# Patient Record
Sex: MALE | Race: BLACK OR AFRICAN AMERICAN | Employment: FULL TIME | URBAN - METROPOLITAN AREA
[De-identification: names, ages, dates, MRNs, and addresses within clinical notes are randomized per-mention and may not be internally consistent; named-entity substitution may affect disease eponyms.]

---

## 2024-04-12 ENCOUNTER — APPOINTMENT (OUTPATIENT)
Dept: VASCULAR SURGERY | Facility: HOSPITAL | Age: 59
End: 2024-04-12

## 2024-04-12 ENCOUNTER — HOSPITAL ENCOUNTER (EMERGENCY)
Facility: HOSPITAL | Age: 59
Discharge: HOME | End: 2024-04-12
Attending: EMERGENCY MEDICINE

## 2024-04-12 VITALS
WEIGHT: 180.34 LBS | OXYGEN SATURATION: 99 % | TEMPERATURE: 98.4 F | SYSTOLIC BLOOD PRESSURE: 140 MMHG | DIASTOLIC BLOOD PRESSURE: 92 MMHG | BODY MASS INDEX: 25.25 KG/M2 | HEIGHT: 71 IN | HEART RATE: 68 BPM | RESPIRATION RATE: 16 BRPM

## 2024-04-12 DIAGNOSIS — I82.432 ACUTE DEEP VEIN THROMBOSIS (DVT) OF POPLITEAL VEIN OF LEFT LOWER EXTREMITY (HCC): Primary | ICD-10-CM

## 2024-04-12 LAB — ECHO BSA: 2.02 M2

## 2024-04-12 PROCEDURE — 93971 EXTREMITY STUDY: CPT

## 2024-04-12 PROCEDURE — 99284 EMERGENCY DEPT VISIT MOD MDM: CPT

## 2024-04-12 PROCEDURE — 6370000000 HC RX 637 (ALT 250 FOR IP)

## 2024-04-12 RX ORDER — ACETAMINOPHEN 500 MG
1000 TABLET ORAL
Status: COMPLETED | OUTPATIENT
Start: 2024-04-12 | End: 2024-04-12

## 2024-04-12 RX ADMIN — ACETAMINOPHEN 1000 MG: 500 TABLET ORAL at 11:19

## 2024-04-12 ASSESSMENT — PAIN DESCRIPTION - DESCRIPTORS: DESCRIPTORS: SHARP;STABBING

## 2024-04-12 ASSESSMENT — PAIN SCALES - GENERAL: PAINLEVEL_OUTOF10: 9

## 2024-04-12 ASSESSMENT — PAIN DESCRIPTION - LOCATION: LOCATION: LEG

## 2024-04-12 ASSESSMENT — ENCOUNTER SYMPTOMS: ABDOMINAL PAIN: 0

## 2024-04-12 ASSESSMENT — PAIN DESCRIPTION - ORIENTATION: ORIENTATION: POSTERIOR;UPPER

## 2024-04-12 ASSESSMENT — PAIN - FUNCTIONAL ASSESSMENT: PAIN_FUNCTIONAL_ASSESSMENT: 0-10

## 2024-04-12 NOTE — ED TRIAGE NOTES
Pt given 1unit of RBC, 2g IV mg this shift. No c/o pain. Instances of feeling SOB, but O2 sats greater than 93%. No c/o pain.    Pt is having Left posterior pain in the upper thigh that's been going on for 3 weeks  Pt was not doing anything to cause this pain but does sts he had a DVT in his left leg  Pt is currently on medication for the clot  Pt sts he has been straining to urinate and has pain when he urinates

## 2024-04-12 NOTE — DISCHARGE INSTRUCTIONS
You have an age-indeterminate blood clot behind your left knee.  We are sending a prescription for Eliquis, a blood thinner, to your pharmacy.  Please take as prescribed.  It is very important that you follow-up with your primary care given your new medication and likely need for a new prescription after this 1 month supply is finished. [facility], is given here as needed.  If symptoms worsen or new symptoms arise, such as chest pain or shortness of breath, please report to the nearest emergency department.  As discussed, the blood thinner will make you more prone to bleeding, so if bleeding concerns arise, please report to the nearest emergency department.    Thank you for allowing us to provide you with medical care today.  We realize that you have many choices for your emergency care needs.  We thank you for choosing [facility].  Please choose us in the future for any continued health care needs.     The exam and treatment you received in the Emergency Department were for an emergent problem and are not intended as complete care. It is important that you follow up with a doctor, nurse practitioner, or physician's assistant for ongoing care. If your symptoms worsen or you do not improve as expected and you are unable to reach your usual health care provider, you should return to the Emergency Department.  We are available 24 hours a day.     Please make an appointment with your health care provider(s) for follow up of your Emergency Department visit.  Take this sheet with you when you go to your follow-up visit.

## 2024-04-12 NOTE — ED PROVIDER NOTES
[facility] EMERGENCY DEPT  EMERGENCY DEPARTMENT ENCOUNTER      Pt Name: Anthony Santos III  MRN: [id number]  Birthdate 1965  Date of evaluation: 4/12/2024  Provider: Erich Arango PA-C    CHIEF COMPLAINT       Chief Complaint   Patient presents with    Leg Pain         HISTORY OF PRESENT ILLNESS   (Location/Symptom, Timing/Onset, Context/Setting, Quality, Duration, Modifying Factors, Severity)  Note limiting factors.   59 year old male with history of blood clot last year in left leg reports to ED with left posterior thigh pain over past 3 weeks.  Endorses improvement of pain when ambulating/playing pickle ball.  Was on Eliquis from his primary care in [place] from May 2023 to August 2023 when he was taken off of it due to the provoked nature of the DVT from a long car ride.  Denies chest pain, shortness of breath, fever, chills, or leg swelling.  Patient recently returned from addiction treatment in [place] and is in the process of establishing primary care here.            Review of External Medical Records:     Nursing Notes were reviewed.    REVIEW OF SYSTEMS    (2-9 systems for level 4, 10 or more for level 5)     Review of Systems   Gastrointestinal:  Negative for abdominal pain.   Genitourinary: Negative.    Neurological:  Negative for dizziness and weakness.       Except as noted above the remainder of the review of systems was reviewed and negative.       PAST MEDICAL HISTORY   No past medical history on file.      SURGICAL HISTORY     No past surgical history on file.      CURRENT MEDICATIONS       Discharge Medication List as of 4/12/2024 12:52 PM          ALLERGIES     Patient has no known allergies.    FAMILY HISTORY     No family history on file.       SOCIAL HISTORY       Social History     Socioeconomic History    Marital status: Single           PHYSICAL EXAM    (up to 7 for level 4, 8 or more for level 5)     ED Triage Vitals   BP Temp Temp src Pulse Resp SpO2 Height Weight   --

## 2024-04-20 ENCOUNTER — HOSPITAL ENCOUNTER (EMERGENCY)
Facility: HOSPITAL | Age: 59
Discharge: HOME | End: 2024-04-20
Attending: EMERGENCY MEDICINE
Payer: MEDICAID

## 2024-04-20 VITALS
BODY MASS INDEX: 24.64 KG/M2 | OXYGEN SATURATION: 100 % | DIASTOLIC BLOOD PRESSURE: 112 MMHG | HEART RATE: 69 BPM | RESPIRATION RATE: 20 BRPM | HEIGHT: 71 IN | WEIGHT: 176 LBS | SYSTOLIC BLOOD PRESSURE: 166 MMHG | TEMPERATURE: 98.5 F

## 2024-04-20 DIAGNOSIS — M54.32 SCIATICA OF LEFT SIDE: Primary | ICD-10-CM

## 2024-04-20 PROCEDURE — 6370000000 HC RX 637 (ALT 250 FOR IP): Performed by: EMERGENCY MEDICINE

## 2024-04-20 PROCEDURE — 99283 EMERGENCY DEPT VISIT LOW MDM: CPT

## 2024-04-20 RX ORDER — HYDROCODONE BITARTRATE AND ACETAMINOPHEN 5; 325 MG/1; MG/1
1 TABLET ORAL
Status: COMPLETED | OUTPATIENT
Start: 2024-04-20 | End: 2024-04-20

## 2024-04-20 RX ORDER — HYDROCODONE BITARTRATE AND ACETAMINOPHEN 5; 325 MG/1; MG/1
1 TABLET ORAL EVERY 6 HOURS PRN
Qty: 11 TABLET | Refills: 0 | Status: SHIPPED | OUTPATIENT
Start: 2024-04-20 | End: 2024-04-23

## 2024-04-20 RX ADMIN — HYDROCODONE BITARTRATE AND ACETAMINOPHEN 1 TABLET: 5; 325 TABLET ORAL at 22:06

## 2024-04-20 ASSESSMENT — PAIN DESCRIPTION - DESCRIPTORS
DESCRIPTORS: THROBBING;SHARP
DESCRIPTORS: SHARP;THROBBING

## 2024-04-20 ASSESSMENT — PAIN - FUNCTIONAL ASSESSMENT: PAIN_FUNCTIONAL_ASSESSMENT: 0-10

## 2024-04-20 ASSESSMENT — PAIN DESCRIPTION - ORIENTATION
ORIENTATION: LEFT
ORIENTATION: POSTERIOR;LEFT

## 2024-04-20 ASSESSMENT — PAIN SCALES - GENERAL
PAINLEVEL_OUTOF10: 10
PAINLEVEL_OUTOF10: 10

## 2024-04-20 ASSESSMENT — PAIN DESCRIPTION - LOCATION
LOCATION: LEG
LOCATION: LEG

## 2024-04-21 NOTE — ED PROVIDER NOTES
[facility] EMERGENCY DEPT  EMERGENCY DEPARTMENT ENCOUNTER      Pt Name: Anthony Santos III  MRN: [id number]  Birthdate 1965  Date of evaluation: 4/20/2024  Provider: Norris Stark MD      HISTORY OF PRESENT ILLNESS      59-year-old male with recent diagnosis of left DVT, started on Eliquis about a week ago presents to the emergency department with his spouse with concern for ongoing potentially worsening left leg pain.  No fevers, no chest pain, no shortness of breath.  He reports compliance with the Eliquis    The history is provided by the patient and medical records.           Nursing Notes were reviewed.    REVIEW OF SYSTEMS         Review of Systems        PAST MEDICAL HISTORY   No past medical history on file.      SURGICAL HISTORY     No past surgical history on file.      CURRENT MEDICATIONS       Previous Medications    APIXABAN (ELIQUIS) 5 MG TABS TABLET    Take 2 tablets by mouth 2 times daily for 7 days, THEN 1 tablet 2 times daily for 21 days.       ALLERGIES     Patient has no known allergies.    FAMILY HISTORY     No family history on file.       SOCIAL HISTORY       Social History     Socioeconomic History    Marital status: Single         PHYSICAL EXAM       ED Triage Vitals [04/20/24 2040]   BP Temp Temp Source Pulse Resp SpO2 Height Weight - Scale   (!) 140/102 98.5 °F (36.9 °C) Oral 69 -- 100 % 1.803 m (5' 11\") 79.8 kg (176 lb)       Body mass index is 24.55 kg/m².    Physical Exam  Vitals and nursing note reviewed.   Constitutional:       Appearance: Normal appearance.   Musculoskeletal:      Right lower leg: No edema.      Left lower leg: Edema (Mild) present.      Comments: Positive straight leg raise on the left   Neurological:      Mental Status: He is alert.             EMERGENCY DEPARTMENT COURSE and DIFFERENTIAL DIAGNOSIS/MDM:   Vitals:    Vitals:    04/20/24 2040   BP: (!) 140/102   Pulse: 69   Temp: 98.5 °F (36.9 °C)   TempSrc: Oral   SpO2: 100%   Weight: 79.8 kg (176 lb)

## 2024-04-21 NOTE — DISCHARGE INSTRUCTIONS
Generally anti-inflammatory medicine such as Motrin, Aleve are used for sciatica type pain.  You are unable to take these due to your blood clot and treatment with a blood thinner.  We are providing short course of pain medicine.  We recommend that you follow-up with a orthopedic or back specialist for further management.

## 2024-04-21 NOTE — ED TRIAGE NOTES
Pt ambulatory to triage w/ c/o left leg pain. Pt was diagnosed last week with a blood clot in that leg. Pt states he has been taking his medication. States pain never went away.    Denies SOB/CP.

## 2024-04-24 ENCOUNTER — OFFICE VISIT (OUTPATIENT)
Age: 59
End: 2024-04-24
Payer: MEDICAID

## 2024-04-24 VITALS
RESPIRATION RATE: 16 BRPM | SYSTOLIC BLOOD PRESSURE: 154 MMHG | TEMPERATURE: 97 F | HEART RATE: 78 BPM | HEIGHT: 71 IN | BODY MASS INDEX: 25.76 KG/M2 | DIASTOLIC BLOOD PRESSURE: 92 MMHG | WEIGHT: 184 LBS | OXYGEN SATURATION: 98 %

## 2024-04-24 DIAGNOSIS — I82.402 RECURRENT ACUTE DEEP VEIN THROMBOSIS (DVT) OF LEFT LOWER EXTREMITY (HCC): ICD-10-CM

## 2024-04-24 DIAGNOSIS — I82.432 ACUTE DEEP VEIN THROMBOSIS (DVT) OF POPLITEAL VEIN OF LEFT LOWER EXTREMITY (HCC): Primary | ICD-10-CM

## 2024-04-24 DIAGNOSIS — Z12.5 SCREENING FOR MALIGNANT NEOPLASM OF PROSTATE: ICD-10-CM

## 2024-04-24 DIAGNOSIS — Z13.220 SCREENING, LIPID: ICD-10-CM

## 2024-04-24 DIAGNOSIS — Z12.11 SCREEN FOR COLON CANCER: ICD-10-CM

## 2024-04-24 DIAGNOSIS — I10 PRIMARY HYPERTENSION: ICD-10-CM

## 2024-04-24 DIAGNOSIS — M79.605 PAIN OF LEFT LOWER EXTREMITY: ICD-10-CM

## 2024-04-24 DIAGNOSIS — N18.31 STAGE 3A CHRONIC KIDNEY DISEASE (HCC): ICD-10-CM

## 2024-04-24 DIAGNOSIS — F19.20 ADDICTION (HCC): ICD-10-CM

## 2024-04-24 PROCEDURE — 3077F SYST BP >= 140 MM HG: CPT | Performed by: INTERNAL MEDICINE

## 2024-04-24 PROCEDURE — 99204 OFFICE O/P NEW MOD 45 MIN: CPT | Performed by: INTERNAL MEDICINE

## 2024-04-24 PROCEDURE — 3080F DIAST BP >= 90 MM HG: CPT | Performed by: INTERNAL MEDICINE

## 2024-04-24 RX ORDER — LISINOPRIL 10 MG/1
TABLET ORAL
COMMUNITY
Start: 2014-05-02 | End: 2024-04-24

## 2024-04-24 RX ORDER — ACETAMINOPHEN 500 MG
1000 TABLET ORAL 3 TIMES DAILY PRN
Qty: 180 TABLET | Refills: 0 | Status: SHIPPED | OUTPATIENT
Start: 2024-04-24 | End: 2024-04-24

## 2024-04-24 RX ORDER — QUETIAPINE FUMARATE 25 MG/1
25 TABLET, FILM COATED ORAL DAILY
COMMUNITY
Start: 2024-04-08 | End: 2024-04-24

## 2024-04-24 RX ORDER — ACETAMINOPHEN 500 MG
1000 TABLET ORAL 3 TIMES DAILY PRN
Qty: 180 TABLET | Refills: 0 | Status: SHIPPED | OUTPATIENT
Start: 2024-04-24 | End: 2024-04-25 | Stop reason: SDUPTHER

## 2024-04-24 RX ORDER — HYDROCHLOROTHIAZIDE 25 MG/1
TABLET ORAL
COMMUNITY
End: 2024-04-24

## 2024-04-24 RX ORDER — HYDROXYZINE HYDROCHLORIDE 25 MG/1
25 TABLET, FILM COATED ORAL EVERY 8 HOURS PRN
COMMUNITY
Start: 2023-04-17 | End: 2024-04-24

## 2024-04-24 RX ORDER — LIDOCAINE 50 MG/G
1 PATCH TOPICAL DAILY
Qty: 10 PATCH | Refills: 0 | Status: SHIPPED | OUTPATIENT
Start: 2024-04-24 | End: 2024-04-24

## 2024-04-24 RX ORDER — CLONIDINE HYDROCHLORIDE 0.1 MG/1
TABLET ORAL
COMMUNITY
Start: 2024-01-11 | End: 2024-04-24 | Stop reason: SDUPTHER

## 2024-04-24 RX ORDER — ALBUTEROL SULFATE 90 UG/1
2 AEROSOL, METERED RESPIRATORY (INHALATION) EVERY 6 HOURS PRN
COMMUNITY
Start: 2023-04-17 | End: 2024-04-24

## 2024-04-24 RX ORDER — AMLODIPINE BESYLATE 10 MG/1
TABLET ORAL
COMMUNITY
Start: 2019-05-21 | End: 2024-04-24

## 2024-04-24 RX ORDER — CLONIDINE HYDROCHLORIDE 0.1 MG/1
0.1 TABLET ORAL 2 TIMES DAILY
Qty: 60 TABLET | Refills: 3 | Status: SHIPPED | OUTPATIENT
Start: 2024-04-24

## 2024-04-24 RX ORDER — LOSARTAN POTASSIUM 50 MG/1
100 TABLET ORAL DAILY
COMMUNITY
Start: 2023-05-03 | End: 2024-04-24

## 2024-04-24 RX ORDER — NIFEDIPINE 90 MG/1
90 TABLET, EXTENDED RELEASE ORAL DAILY
COMMUNITY
Start: 2023-04-17 | End: 2024-04-24

## 2024-04-24 RX ORDER — LIDOCAINE 50 MG/G
1 PATCH TOPICAL DAILY
Qty: 10 PATCH | Refills: 0 | Status: SHIPPED | OUTPATIENT
Start: 2024-04-24 | End: 2024-04-25 | Stop reason: SDUPTHER

## 2024-04-24 RX ORDER — BACLOFEN 10 MG/1
TABLET ORAL
COMMUNITY
Start: 2024-04-17

## 2024-04-24 RX ORDER — BUPRENORPHINE HYDROCHLORIDE, NALOXONE HYDROCHLORIDE 8; 2 MG/1; MG/1
FILM, SOLUBLE BUCCAL; SUBLINGUAL
COMMUNITY
Start: 2024-04-17 | End: 2024-04-24

## 2024-04-24 RX ORDER — ONDANSETRON 4 MG/1
TABLET, ORALLY DISINTEGRATING ORAL
COMMUNITY
Start: 2024-04-08 | End: 2024-04-24

## 2024-04-24 SDOH — ECONOMIC STABILITY: HOUSING INSECURITY
IN THE LAST 12 MONTHS, WAS THERE A TIME WHEN YOU DID NOT HAVE A STEADY PLACE TO SLEEP OR SLEPT IN A SHELTER (INCLUDING NOW)?: NO

## 2024-04-24 SDOH — ECONOMIC STABILITY: FOOD INSECURITY: WITHIN THE PAST 12 MONTHS, YOU WORRIED THAT YOUR FOOD WOULD RUN OUT BEFORE YOU GOT MONEY TO BUY MORE.: NEVER TRUE

## 2024-04-24 SDOH — ECONOMIC STABILITY: FOOD INSECURITY: WITHIN THE PAST 12 MONTHS, THE FOOD YOU BOUGHT JUST DIDN'T LAST AND YOU DIDN'T HAVE MONEY TO GET MORE.: NEVER TRUE

## 2024-04-24 SDOH — ECONOMIC STABILITY: INCOME INSECURITY: HOW HARD IS IT FOR YOU TO PAY FOR THE VERY BASICS LIKE FOOD, HOUSING, MEDICAL CARE, AND HEATING?: NOT HARD AT ALL

## 2024-04-24 ASSESSMENT — PATIENT HEALTH QUESTIONNAIRE - PHQ9
2. FEELING DOWN, DEPRESSED OR HOPELESS: SEVERAL DAYS
SUM OF ALL RESPONSES TO PHQ QUESTIONS 1-9: 2
SUM OF ALL RESPONSES TO PHQ9 QUESTIONS 1 & 2: 2
1. LITTLE INTEREST OR PLEASURE IN DOING THINGS: SEVERAL DAYS

## 2024-04-24 ASSESSMENT — ENCOUNTER SYMPTOMS
EYES NEGATIVE: 1
RESPIRATORY NEGATIVE: 1
GASTROINTESTINAL NEGATIVE: 1

## 2024-04-24 NOTE — PROGRESS NOTES
04/24/24 1311   BP: (!) 154/92   Pulse: 78   Resp: 16   Temp: 97 °F (36.1 °C)   SpO2: 98%     Physical Exam  Vital Signs  The patient's blood pressure is 154/92. Their pulse is 78. Their temperature is normal. Their weight is 184 pounds, 5 feet 11 inches.  HEENT: NAD.    Neck: Supple, nodule.  Or carotid bruit or thyromegaly.    Chest: Clear to auscultate bilaterally, no rales or rhonchi.    Cardiovascular system, S1, S2 normal, regular rate and rhythm.    Abdomen: Soft, nontender, nondistended, bowel sound present.    Extremities: No edema noticed, grossly normal.    Neurological exam: Alert oriented x 3, cranial nerves II to XII grossly intact, motor 5 x 5 bilaterally, sensory within normal limit.          Anthony was seen today for establish care and leg pain.    Diagnoses and all orders for this visit:    Acute deep vein thrombosis (DVT) of popliteal vein of left lower extremity (HCC)    He has recurrent DVT, last DVT was same time oh 1 AM before exam left lower leg.  He finished a blood thinner and again having DVT.  He did not have any hypercoagulable testing done but I can see that he had an ROSALIA test done which was abnormal.  Probably he will be on Eliquis forever.  But will refer him to hematologist to decide the duration of blood thinner.  -     AFL - Matthieu Shaikh MD, Hematology/Oncology, [facility] ([facility])    Pain of left lower extremity    He is a recovering addict.  Cannot take any pain medication.  Has moderate pain on the left lower extremity from DVT.  I advised him to take Tylenol 3 times a day.  Will also give patches.  -    : acetaminophen (TYLENOL) 500 MG tablet; Take 2 tablets by mouth 3 times daily as needed for Pain  lidocaine (LIDODERM) 5 %; Place 1 patch onto the skin daily for 10 days 12 hours on, 12 hours off.    Primary hypertension    Slightly elevated blood pressure.  He is on clonidine.  I am not sure if he is taking it.  Will call it in.  Already has CKD.  Will refer him to a

## 2024-04-25 DIAGNOSIS — M79.605 PAIN OF LEFT LOWER EXTREMITY: ICD-10-CM

## 2024-04-25 RX ORDER — ACETAMINOPHEN 500 MG
1000 TABLET ORAL 3 TIMES DAILY PRN
Qty: 180 TABLET | Refills: 0 | Status: SHIPPED | OUTPATIENT
Start: 2024-04-25

## 2024-04-25 RX ORDER — LIDOCAINE 50 MG/G
1 PATCH TOPICAL DAILY
Qty: 10 PATCH | Refills: 0 | Status: SHIPPED | OUTPATIENT
Start: 2024-04-25 | End: 2024-05-05

## 2024-04-25 NOTE — TELEPHONE ENCOUNTER
Disp Refills Start End    acetaminophen (TYLENOL) 500 MG tablet 180 tablet 0 4/24/2024 --    Sig - Route: Take 2 tablets by mouth 3 times daily as needed for Pain - Oral    Sent to pharmacy as: Acetaminophen 500 MG Oral Tablet (TYLENOL)    E-Prescribing Status: Transmission to pharmacy failed (4/24/2024  4:11 PM EDT)        Resent

## 2024-04-25 NOTE — TELEPHONE ENCOUNTER
Sig - Route: Place 1 patch onto the skin daily for 10 days 12 hours on, 12 hours off. - TransDERmal    Sent to pharmacy as: Lidocaine 5 % External Patch (LIDODERM)    E-Prescribing Status: Transmission to pharmacy failed (4/24/2024  4:11 PM EDT)        Resent

## 2024-08-24 ENCOUNTER — APPOINTMENT (OUTPATIENT)
Facility: HOSPITAL | Age: 59
End: 2024-08-24
Payer: MEDICAID

## 2024-08-24 ENCOUNTER — HOSPITAL ENCOUNTER (EMERGENCY)
Facility: HOSPITAL | Age: 59
Discharge: HOME | End: 2024-08-24
Attending: STUDENT IN AN ORGANIZED HEALTH CARE EDUCATION/TRAINING PROGRAM
Payer: MEDICAID

## 2024-08-24 VITALS
HEART RATE: 98 BPM | DIASTOLIC BLOOD PRESSURE: 131 MMHG | RESPIRATION RATE: 26 BRPM | OXYGEN SATURATION: 97 % | SYSTOLIC BLOOD PRESSURE: 155 MMHG | TEMPERATURE: 98.5 F

## 2024-08-24 DIAGNOSIS — T40.601A OPIATE OVERDOSE, ACCIDENTAL OR UNINTENTIONAL, INITIAL ENCOUNTER (HCC): Primary | ICD-10-CM

## 2024-08-24 LAB
ALBUMIN SERPL-MCNC: 4.2 G/DL (ref 3.5–5)
ALBUMIN/GLOB SERPL: 1 (ref 1.1–2.2)
ALP SERPL-CCNC: 90 U/L (ref 45–117)
ALT SERPL-CCNC: 29 U/L (ref 12–78)
ANION GAP SERPL CALC-SCNC: 13 MMOL/L (ref 5–15)
AST SERPL-CCNC: 37 U/L (ref 15–37)
BASOPHILS # BLD: 0 K/UL (ref 0–0.1)
BASOPHILS NFR BLD: 0 % (ref 0–1)
BILIRUB SERPL-MCNC: 0.5 MG/DL (ref 0.2–1)
BUN SERPL-MCNC: 28 MG/DL (ref 6–20)
BUN/CREAT SERPL: 13 (ref 12–20)
CALCIUM SERPL-MCNC: 9.5 MG/DL (ref 8.5–10.1)
CHLORIDE SERPL-SCNC: 103 MMOL/L (ref 97–108)
CO2 SERPL-SCNC: 22 MMOL/L (ref 21–32)
CREAT SERPL-MCNC: 2.18 MG/DL (ref 0.7–1.3)
DIFFERENTIAL METHOD BLD: NORMAL
EOSINOPHIL # BLD: 0 K/UL (ref 0–0.4)
EOSINOPHIL NFR BLD: 0 % (ref 0–7)
ERYTHROCYTE [DISTWIDTH] IN BLOOD BY AUTOMATED COUNT: 14.1 % (ref 11.5–14.5)
GLOBULIN SER CALC-MCNC: 4.4 G/DL (ref 2–4)
GLUCOSE SERPL-MCNC: 85 MG/DL (ref 65–100)
HCT VFR BLD AUTO: 41 % (ref 36.6–50.3)
HGB BLD-MCNC: 12.8 G/DL (ref 12.1–17)
IMM GRANULOCYTES # BLD AUTO: 0 K/UL (ref 0–0.04)
IMM GRANULOCYTES NFR BLD AUTO: 0 % (ref 0–0.5)
LYMPHOCYTES # BLD: 1.7 K/UL (ref 0.8–3.5)
LYMPHOCYTES NFR BLD: 33 % (ref 12–49)
MCH RBC QN AUTO: 26.5 PG (ref 26–34)
MCHC RBC AUTO-ENTMCNC: 31.2 G/DL (ref 30–36.5)
MCV RBC AUTO: 84.9 FL (ref 80–99)
MONOCYTES # BLD: 0.5 K/UL (ref 0–1)
MONOCYTES NFR BLD: 9 % (ref 5–13)
NEUTS SEG # BLD: 3 K/UL (ref 1.8–8)
NEUTS SEG NFR BLD: 58 % (ref 32–75)
NRBC # BLD: 0 K/UL (ref 0–0.01)
NRBC BLD-RTO: 0 PER 100 WBC
PLATELET # BLD AUTO: 195 K/UL (ref 150–400)
PMV BLD AUTO: 9.6 FL (ref 8.9–12.9)
POTASSIUM SERPL-SCNC: 3.9 MMOL/L (ref 3.5–5.1)
PROT SERPL-MCNC: 8.6 G/DL (ref 6.4–8.2)
RBC # BLD AUTO: 4.83 M/UL (ref 4.1–5.7)
SODIUM SERPL-SCNC: 138 MMOL/L (ref 136–145)
WBC # BLD AUTO: 5.3 K/UL (ref 4.1–11.1)

## 2024-08-24 PROCEDURE — 71045 X-RAY EXAM CHEST 1 VIEW: CPT

## 2024-08-24 PROCEDURE — 80053 COMPREHEN METABOLIC PANEL: CPT

## 2024-08-24 PROCEDURE — 36415 COLL VENOUS BLD VENIPUNCTURE: CPT

## 2024-08-24 PROCEDURE — 93005 ELECTROCARDIOGRAM TRACING: CPT | Performed by: STUDENT IN AN ORGANIZED HEALTH CARE EDUCATION/TRAINING PROGRAM

## 2024-08-24 PROCEDURE — 96361 HYDRATE IV INFUSION ADD-ON: CPT

## 2024-08-24 PROCEDURE — 2580000003 HC RX 258: Performed by: STUDENT IN AN ORGANIZED HEALTH CARE EDUCATION/TRAINING PROGRAM

## 2024-08-24 PROCEDURE — 85025 COMPLETE CBC W/AUTO DIFF WBC: CPT

## 2024-08-24 PROCEDURE — 99285 EMERGENCY DEPT VISIT HI MDM: CPT

## 2024-08-24 PROCEDURE — 96360 HYDRATION IV INFUSION INIT: CPT

## 2024-08-24 RX ORDER — 0.9 % SODIUM CHLORIDE 0.9 %
1000 INTRAVENOUS SOLUTION INTRAVENOUS ONCE
Status: COMPLETED | OUTPATIENT
Start: 2024-08-24 | End: 2024-08-24

## 2024-08-24 RX ADMIN — SODIUM CHLORIDE 1000 ML: 9 INJECTION, SOLUTION INTRAVENOUS at 08:49

## 2024-08-24 ASSESSMENT — PAIN - FUNCTIONAL ASSESSMENT: PAIN_FUNCTIONAL_ASSESSMENT: NONE - DENIES PAIN
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
## 2024-08-24 NOTE — ED TRIAGE NOTES
Pt came into ED via EMS d/t drug overdose. EMS gave pt 2mg of Narcan IN. Pt alert & oriented x 4. Pt BP elevated. Pt has hx of hypertension.

## 2024-08-24 NOTE — ED NOTES
Discharge instructions were given to the patient by Myrna GOLDSMITH. The patient left the Emergency Department ambulatory, alert and oriented and in no acute distress with 1 prescriptions. The patient was encouraged to call or return to the ED for worsening issues or problems and was encouraged to schedule a follow up appointment for continuing care. The patient verbalized understanding of discharge instructions and prescriptions, all questions were answered. The patient has no further concerns at this time.
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
## 2024-08-25 LAB
EKG ATRIAL RATE: 106 BPM
EKG DIAGNOSIS: NORMAL
EKG P AXIS: 51 DEGREES
EKG P-R INTERVAL: 204 MS
EKG Q-T INTERVAL: 346 MS
EKG QRS DURATION: 76 MS
EKG QTC CALCULATION (BAZETT): 459 MS
EKG R AXIS: -32 DEGREES
EKG T AXIS: 15 DEGREES
EKG VENTRICULAR RATE: 106 BPM

## 2024-08-25 PROCEDURE — 93010 ELECTROCARDIOGRAM REPORT: CPT | Performed by: SPECIALIST

## 2024-12-16 ENCOUNTER — TELEPHONE (OUTPATIENT)
Age: 59
End: 2024-12-16

## 2024-12-16 ENCOUNTER — OFFICE VISIT (OUTPATIENT)
Age: 59
End: 2024-12-16
Payer: MEDICAID

## 2024-12-16 VITALS
BODY MASS INDEX: 28.7 KG/M2 | SYSTOLIC BLOOD PRESSURE: 144 MMHG | WEIGHT: 205 LBS | HEIGHT: 71 IN | HEART RATE: 75 BPM | TEMPERATURE: 98.2 F | DIASTOLIC BLOOD PRESSURE: 92 MMHG | RESPIRATION RATE: 16 BRPM | OXYGEN SATURATION: 99 %

## 2024-12-16 DIAGNOSIS — N28.9 RENAL INSUFFICIENCY: ICD-10-CM

## 2024-12-16 DIAGNOSIS — N40.1 BENIGN PROSTATIC HYPERPLASIA WITH LOWER URINARY TRACT SYMPTOMS, SYMPTOM DETAILS UNSPECIFIED: ICD-10-CM

## 2024-12-16 DIAGNOSIS — Z23 NEED FOR VACCINATION: ICD-10-CM

## 2024-12-16 DIAGNOSIS — Z12.5 SCREENING FOR PROSTATE CANCER: ICD-10-CM

## 2024-12-16 DIAGNOSIS — Z11.59 NEED FOR HEPATITIS C SCREENING TEST: ICD-10-CM

## 2024-12-16 DIAGNOSIS — I10 PRIMARY HYPERTENSION: Primary | ICD-10-CM

## 2024-12-16 DIAGNOSIS — F11.21 HISTORY OF NARCOTIC ADDICTION (HCC): ICD-10-CM

## 2024-12-16 DIAGNOSIS — I82.402 RECURRENT ACUTE DEEP VEIN THROMBOSIS (DVT) OF LEFT LOWER EXTREMITY (HCC): ICD-10-CM

## 2024-12-16 DIAGNOSIS — Z12.11 SCREEN FOR COLON CANCER: ICD-10-CM

## 2024-12-16 LAB
BASOPHILS # BLD AUTO: 0 X10E3/UL (ref 0–0.2)
BASOPHILS NFR BLD AUTO: 1 %
EOSINOPHIL # BLD AUTO: 0 X10E3/UL (ref 0–0.4)
EOSINOPHIL NFR BLD AUTO: 1 %
ERYTHROCYTE [DISTWIDTH] IN BLOOD BY AUTOMATED COUNT: 13.3 % (ref 11.6–15.4)
HCT VFR BLD AUTO: 47.8 % (ref 37.5–51)
HGB BLD-MCNC: 15.3 G/DL (ref 13–17.7)
IMM GRANULOCYTES # BLD AUTO: 0 X10E3/UL (ref 0–0.1)
IMM GRANULOCYTES NFR BLD AUTO: 0 %
LYMPHOCYTES # BLD AUTO: 1.7 X10E3/UL (ref 0.7–3.1)
LYMPHOCYTES NFR BLD AUTO: 45 %
MCH RBC QN AUTO: 26.9 PG (ref 26.6–33)
MCHC RBC AUTO-ENTMCNC: 32 G/DL (ref 31.5–35.7)
MCV RBC AUTO: 84 FL (ref 79–97)
MONOCYTES # BLD AUTO: 0.3 X10E3/UL (ref 0.1–0.9)
MONOCYTES NFR BLD AUTO: 8 %
NEUTROPHILS # BLD AUTO: 1.7 X10E3/UL (ref 1.4–7)
NEUTROPHILS NFR BLD AUTO: 45 %
PLATELET # BLD AUTO: 181 X10E3/UL (ref 150–450)
RBC # BLD AUTO: 5.68 X10E6/UL (ref 4.14–5.8)
WBC # BLD AUTO: 3.8 X10E3/UL (ref 3.4–10.8)

## 2024-12-16 PROCEDURE — 99214 OFFICE O/P EST MOD 30 MIN: CPT | Performed by: INTERNAL MEDICINE

## 2024-12-16 PROCEDURE — 3077F SYST BP >= 140 MM HG: CPT | Performed by: INTERNAL MEDICINE

## 2024-12-16 PROCEDURE — 3080F DIAST BP >= 90 MM HG: CPT | Performed by: INTERNAL MEDICINE

## 2024-12-16 PROCEDURE — 90661 CCIIV3 VAC ABX FR 0.5 ML IM: CPT | Performed by: INTERNAL MEDICINE

## 2024-12-16 PROCEDURE — PBSHW INFLUENZA, FLUCELVAX TRIVALENT, (AGE 6 MO+) IM, PRESERVATIVE FREE, 0.5ML: Performed by: INTERNAL MEDICINE

## 2024-12-16 RX ORDER — BUPRENORPHINE HYDROCHLORIDE, NALOXONE HYDROCHLORIDE 8; 2 MG/1; MG/1
FILM, SOLUBLE BUCCAL; SUBLINGUAL
COMMUNITY
Start: 2024-09-18 | End: 2024-12-16

## 2024-12-16 RX ORDER — AMLODIPINE BESYLATE 5 MG/1
5 TABLET ORAL DAILY
COMMUNITY
End: 2024-12-16 | Stop reason: SDUPTHER

## 2024-12-16 RX ORDER — FAMOTIDINE 20 MG/1
20 TABLET, FILM COATED ORAL 2 TIMES DAILY
COMMUNITY
End: 2024-12-16

## 2024-12-16 RX ORDER — TAMSULOSIN HYDROCHLORIDE 0.4 MG/1
0.4 CAPSULE ORAL DAILY
COMMUNITY
End: 2024-12-16 | Stop reason: SDUPTHER

## 2024-12-16 RX ORDER — TAMSULOSIN HYDROCHLORIDE 0.4 MG/1
0.4 CAPSULE ORAL DAILY
Qty: 90 CAPSULE | Refills: 1 | Status: SHIPPED | OUTPATIENT
Start: 2024-12-16

## 2024-12-16 RX ORDER — CLONIDINE HYDROCHLORIDE 0.1 MG/1
0.1 TABLET ORAL 2 TIMES DAILY
COMMUNITY

## 2024-12-16 RX ORDER — AMLODIPINE BESYLATE 5 MG/1
5 TABLET ORAL DAILY
Qty: 90 TABLET | Refills: 1 | Status: SHIPPED | OUTPATIENT
Start: 2024-12-16

## 2024-12-16 ASSESSMENT — ENCOUNTER SYMPTOMS
GASTROINTESTINAL NEGATIVE: 1
EYES NEGATIVE: 1
RESPIRATORY NEGATIVE: 1
BACK PAIN: 1

## 2024-12-16 NOTE — TELEPHONE ENCOUNTER
Called patient to schedule NP appointment no answer and unable to leave voicemail.    \"NP/DVT/Dr. Collins\"    N/A

## 2024-12-16 NOTE — PROGRESS NOTES
After obtaining consent, and per orders of Dr. Colilns, injection of Flu vaccine given by Owen Dan MA.   
Chief Complaint   Patient presents with    Pre-op Exam     \"Have you been to the ER, urgent care clinic since your last visit?  Hospitalized since your last visit?\"    NO    “Have you seen or consulted any other health care providers outside our system since your last visit?”    NO      “Have you had a colorectal cancer screening such as a colonoscopy/FIT/Cologuard?    NO    No colonoscopy on file  No cologuard on file  No FIT/FOBT on file   No flexible sigmoidoscopy on file          
dose of clonidine today.    Substance Abuse  He recently completed a 6-month rehabilitation program for opiate addiction, specifically heroin. He is not currently on Suboxone. He maintains sobriety through regular attendance at meetings and Tenriism services. He is committed to improving his physical health and has begun a fitness regimen.  - Onset: Recently completed a 6-month rehabilitation program.  - Character: Opiate addiction, specifically heroin.  - Alleviating/Aggravating Factors: Maintains sobriety through meetings and Tenriism services.    Supplemental Information  He has a history of glaucoma, which was not managed during his stay at the rehabilitation facility.    SOCIAL HISTORY  - Admits to using opiates  - Has been in a rehab facility for 6 months  - Does not endorse any current drug use    MEDICATIONS  - Current: Amlodipine, clonidine, Flomax, guaifenesin, melatonin, Imodium, diazepam, Tylenol, magnesium  - Discontinued: Eliquis    Past Medical History:   Diagnosis Date    Chronic kidney disease     DVT (deep venous thrombosis) ([facility])     Hyperlipidemia     Hypertension      Review of Systems   Constitutional: Negative.    HENT: Negative.     Eyes: Negative.    Respiratory: Negative.     Cardiovascular: Negative.    Gastrointestinal: Negative.    Endocrine: Negative.    Genitourinary: Negative.    Musculoskeletal:  Positive for arthralgias and back pain.   Skin: Negative.    Neurological: Negative.    Hematological: Negative.    Psychiatric/Behavioral: Negative.         Vitals:    12/16/24 0906   BP: (!) 144/92   Pulse: 75   Resp: 16   Temp: 98.2 °F (36.8 °C)   SpO2: 99%     Physical Exam    Vital Signs  Blood pressure is slightly elevated at 140/90.      Physical Exam  Vitals and nursing note reviewed.   Constitutional:       General: She is not in acute distress.     Appearance: Normal appearance.well-developed,not diaphoretic.   HENT:       Neck: Supple, no JVP or carotid bruit. No cervical adenopathy.

## 2024-12-17 LAB
ALBUMIN SERPL-MCNC: 4.3 G/DL (ref 3.8–4.9)
ALP SERPL-CCNC: 85 IU/L (ref 44–121)
ALT SERPL-CCNC: 13 IU/L (ref 0–44)
AST SERPL-CCNC: 24 IU/L (ref 0–40)
BILIRUB SERPL-MCNC: 0.4 MG/DL (ref 0–1.2)
BUN SERPL-MCNC: 17 MG/DL (ref 6–24)
BUN/CREAT SERPL: 10 (ref 9–20)
CALCIUM SERPL-MCNC: 10.3 MG/DL (ref 8.7–10.2)
CHLORIDE SERPL-SCNC: 104 MMOL/L (ref 96–106)
CO2 SERPL-SCNC: 25 MMOL/L (ref 20–29)
CREAT SERPL-MCNC: 1.68 MG/DL (ref 0.76–1.27)
EGFRCR SERPLBLD CKD-EPI 2021: 47 ML/MIN/1.73
GLOBULIN SER CALC-MCNC: 3.7 G/DL (ref 1.5–4.5)
GLUCOSE SERPL-MCNC: 78 MG/DL (ref 70–99)
HCV IGG SERPL QL IA: NON REACTIVE
LDLC SERPL DIRECT ASSAY-MCNC: 109 MG/DL (ref 0–99)
POTASSIUM SERPL-SCNC: 4.6 MMOL/L (ref 3.5–5.2)
PROT SERPL-MCNC: 8 G/DL (ref 6–8.5)
PSA SERPL-MCNC: 1.1 NG/ML (ref 0–4)
REPORT: NORMAL
SODIUM SERPL-SCNC: 142 MMOL/L (ref 134–144)

## 2024-12-19 ENCOUNTER — TELEPHONE (OUTPATIENT)
Age: 59
End: 2024-12-19

## 2024-12-19 NOTE — TELEPHONE ENCOUNTER
Pt called to give updated fax number for PreOp forms once completed.     Fax: [phone]  
Will re-fax. Was faxed to number on form as previously directed by patient.   
Labs/Imaging Studies/Medications

## 2024-12-30 ENCOUNTER — TELEPHONE (OUTPATIENT)
Age: 59
End: 2024-12-30

## 2025-01-23 ENCOUNTER — ANESTHESIA EVENT (OUTPATIENT)
Facility: HOSPITAL | Age: 60
End: 2025-01-23
Payer: MEDICAID

## 2025-01-23 ENCOUNTER — ANESTHESIA (OUTPATIENT)
Facility: HOSPITAL | Age: 60
End: 2025-01-23
Payer: MEDICAID

## 2025-01-23 ENCOUNTER — HOSPITAL ENCOUNTER (OUTPATIENT)
Facility: HOSPITAL | Age: 60
Setting detail: OUTPATIENT SURGERY
Discharge: HOME | End: 2025-01-23
Attending: INTERNAL MEDICINE | Admitting: INTERNAL MEDICINE
Payer: MEDICAID

## 2025-01-23 VITALS
HEART RATE: 74 BPM | SYSTOLIC BLOOD PRESSURE: 131 MMHG | OXYGEN SATURATION: 99 % | TEMPERATURE: 98.1 F | DIASTOLIC BLOOD PRESSURE: 97 MMHG | RESPIRATION RATE: 18 BRPM | BODY MASS INDEX: 28.95 KG/M2 | HEIGHT: 71 IN | WEIGHT: 206.79 LBS

## 2025-01-23 PROCEDURE — 3700000000 HC ANESTHESIA ATTENDED CARE: Performed by: INTERNAL MEDICINE

## 2025-01-23 PROCEDURE — 7100000011 HC PHASE II RECOVERY - ADDTL 15 MIN: Performed by: INTERNAL MEDICINE

## 2025-01-23 PROCEDURE — 2580000003 HC RX 258: Performed by: INTERNAL MEDICINE

## 2025-01-23 PROCEDURE — 88305 TISSUE EXAM BY PATHOLOGIST: CPT

## 2025-01-23 PROCEDURE — 3600007502: Performed by: INTERNAL MEDICINE

## 2025-01-23 PROCEDURE — 3700000001 HC ADD 15 MINUTES (ANESTHESIA): Performed by: INTERNAL MEDICINE

## 2025-01-23 PROCEDURE — 6360000002 HC RX W HCPCS: Performed by: NURSE ANESTHETIST, CERTIFIED REGISTERED

## 2025-01-23 PROCEDURE — 2709999900 HC NON-CHARGEABLE SUPPLY: Performed by: INTERNAL MEDICINE

## 2025-01-23 PROCEDURE — 7100000010 HC PHASE II RECOVERY - FIRST 15 MIN: Performed by: INTERNAL MEDICINE

## 2025-01-23 PROCEDURE — 3600007512: Performed by: INTERNAL MEDICINE

## 2025-01-23 RX ORDER — SODIUM CHLORIDE 0.9 % (FLUSH) 0.9 %
5-40 SYRINGE (ML) INJECTION PRN
Status: DISCONTINUED | OUTPATIENT
Start: 2025-01-23 | End: 2025-01-23 | Stop reason: HOSPADM

## 2025-01-23 RX ORDER — SODIUM CHLORIDE 0.9 % (FLUSH) 0.9 %
5-40 SYRINGE (ML) INJECTION EVERY 12 HOURS SCHEDULED
Status: DISCONTINUED | OUTPATIENT
Start: 2025-01-23 | End: 2025-01-23 | Stop reason: HOSPADM

## 2025-01-23 RX ORDER — SODIUM CHLORIDE 9 MG/ML
INJECTION, SOLUTION INTRAVENOUS PRN
Status: DISCONTINUED | OUTPATIENT
Start: 2025-01-23 | End: 2025-01-23 | Stop reason: HOSPADM

## 2025-01-23 RX ORDER — PROPOFOL 10 MG/ML
INJECTION, EMULSION INTRAVENOUS
Status: DISCONTINUED | OUTPATIENT
Start: 2025-01-23 | End: 2025-01-23 | Stop reason: SDUPTHER

## 2025-01-23 RX ADMIN — PROPOFOL 140 MCG/KG/MIN: 10 INJECTION, EMULSION INTRAVENOUS at 09:42

## 2025-01-23 RX ADMIN — PROPOFOL 100 MG: 10 INJECTION, EMULSION INTRAVENOUS at 09:44

## 2025-01-23 RX ADMIN — LIDOCAINE HYDROCHLORIDE 40 MG: 20 INJECTION, SOLUTION INFILTRATION; PERINEURAL at 09:43

## 2025-01-23 RX ADMIN — PROPOFOL 50 MG: 10 INJECTION, EMULSION INTRAVENOUS at 09:46

## 2025-01-23 RX ADMIN — SODIUM CHLORIDE: 9 INJECTION, SOLUTION INTRAVENOUS at 09:13

## 2025-01-23 ASSESSMENT — PAIN SCALES - GENERAL
PAINLEVEL_OUTOF10: 0

## 2025-01-23 ASSESSMENT — PAIN - FUNCTIONAL ASSESSMENT: PAIN_FUNCTIONAL_ASSESSMENT: 0-10

## 2025-01-23 NOTE — PROCEDURES
[facility]  West Krueger M.D.  [phone]            2025          Colonoscopy Operative Report  Anthony Santos Jr.  :  1965  [facility] Medical Record Number:  [id number]      Indications:    Screening colonoscopy     :  West Krueger MD    Assistant -- None  Implants -- None    Referring Provider: Jeimy Collins MD    Sedation:  MAC anesthesia Propofol    Pre-Procedural Exam:      Airway: clear,  No airway problems anticipated  Heart: RRR, without gallops or rubs  Lungs: clear bilaterally without wheezes, crackles, or rhonchi  Abdomen: soft, nontender, nondistended, bowel sounds present  Mental Status: awake, alert and oriented to person, place and time     Procedure Details:  After informed consent was obtained with all risks and benefits of procedure explained and preoperative exam completed, the patient was taken to the endoscopy suite and placed in the left lateral decubitus position.  Upon sequential sedation as per above, a digital rectal exam was performed. The Olympus videocolonoscope  was inserted in the rectum and carefully advanced to the cecum, which was identified by the ileocecal valve and appendiceal orifice.  The quality of preparation was good.  The colonoscope was slowly withdrawn with careful inspection and evaluation between folds. Retroflexion in the rectum was performed.    Findings:   Terminal Ileum: not intubated  Cecum: normal  Ascending Colon: normal  Transverse Colon: normal  Descending Colon: normal  Sigmoid: normal  Rectum: 1  Sessile polyp(s), the largest 6 mm in size;    Interventions:  1 complete polypectomy were performed using cold snare  and the polyps were  retrieved    Specimen Removed:  specimen #1, 6 mm in size, located in the rectum removed by cold snare and retrieved for pathology    Complications: None.     EBL:  None.    Impression:  1 polyp removed as above. Mild pan-colonic diverticulosis noted    Recommendations:  -Await

## 2025-01-23 NOTE — ANESTHESIA PRE PROCEDURE
Department of Anesthesiology  Preprocedure Note       Name:  Anthony Santos Jr.   Age:  59 y.o.  :  1965                                          MRN:  [id number]         Date:  2025      Surgeon: Surgeon(s):  West Krueger MD    Procedure: Procedure(s):  COLONOSCOPY    Medications prior to admission:   Prior to Admission medications    Medication Sig Start Date End Date Taking? Authorizing Provider   cloNIDine (CATAPRES) 0.1 MG tablet Take 1 tablet by mouth 2 times daily   Yes ProviderJose Carlos MD   tamsulosin (FLOMAX) 0.4 MG capsule Take 1 capsule by mouth daily 24  Yes Jeimy Collins MD   amLODIPine (NORVASC) 5 MG tablet Take 1 tablet by mouth daily 24  Yes Jeimy Collins MD   acetaminophen (TYLENOL) 500 MG tablet Take 2 tablets by mouth 3 times daily as needed for Pain 24   Jeimy Collins MD       Current medications:    Current Facility-Administered Medications   Medication Dose Route Frequency Provider Last Rate Last Admin   • sodium chloride flush 0.9 % injection 5-40 mL  5-40 mL IntraVENous 2 times per day West Krueger MD       • sodium chloride flush 0.9 % injection 5-40 mL  5-40 mL IntraVENous PRN West Krueger MD       • 0.9 % sodium chloride infusion   IntraVENous PRN West Krueger  mL/hr at 25 09 New Bag at 25 09       Allergies:  No Known Allergies    Problem List:  There is no problem list on file for this patient.      Past Medical History:        Diagnosis Date   • Arthritis    • Chronic kidney disease    • DVT (deep venous thrombosis) ([facility])     Left leg---date is approximate   • Hyperlipidemia    • Hypertension        Past Surgical History:        Procedure Laterality Date   • BICEPS TENDON REPAIR Right        Social History:    Social History     Tobacco Use   • Smoking status: Never     Passive exposure: Never   • Smokeless tobacco: Never   Substance Use Topics   • Alcohol use: Not Currently     Comment: Occasionally

## 2025-01-23 NOTE — ANESTHESIA POSTPROCEDURE EVALUATION
Department of Anesthesiology  Postprocedure Note    Patient: Anthony Santos Jr.  MRN: [id number]  YOB: 1965  Date of evaluation: 1/23/2025    Procedure Summary       Date: 01/23/25 Room / Location: [place] [address] / [facility] ENDOSCOPY    Anesthesia Start: 0940 Anesthesia Stop: 0958    Procedure: COLONOSCOPY WITH REMOVAL POLYP SNARE/BIOPSY FORCEPS Diagnosis:       Screening for colon cancer      (Screening for colon cancer [Z12.11])    Surgeons: West Krueger MD Responsible Provider: Abner Shah MD    Anesthesia Type: MAC ASA Status: 2            Anesthesia Type: MAC    Mary Phase I: Mary Score: 10    Mary Phase II:      Anesthesia Post Evaluation    Patient location during evaluation: PACU  Patient participation: complete - patient participated  Level of consciousness: awake and alert  Pain score: 0  Airway patency: patent  Nausea & Vomiting: no nausea and no vomiting  Cardiovascular status: blood pressure returned to baseline  Respiratory status: acceptable  Hydration status: euvolemic  Pain management: satisfactory to patient    No notable events documented.

## 2025-01-23 NOTE — H&P
[facility]  West Krueger M.D.  [phone]            History and Physical       NAME:  Anthony Santos Jr.   :   1965   MRN:   [id number]       Referring Physician:  Jeimy Collins MD      Consult Date: 2025 9:01 AM    Chief Complaint:  Colon cancer screening    History of Present Illness:  Patient is a 59 y.o. who is seen for colon cancer screening. Denies any ongoing GI complaints.      PMH:  Past Medical History:   Diagnosis Date    Arthritis     Chronic kidney disease     DVT (deep venous thrombosis) (HCC)     Left leg---date is approximate    Hyperlipidemia     Hypertension        PSH:  Past Surgical History:   Procedure Laterality Date    BICEPS TENDON REPAIR Right        Allergies:  No Known Allergies    Home Medications:  Prior to Admission Medications   Prescriptions Last Dose Informant Patient Reported? Taking?   acetaminophen (TYLENOL) 500 MG tablet Unknown  No No   Sig: Take 2 tablets by mouth 3 times daily as needed for Pain   amLODIPine (NORVASC) 5 MG tablet 2025  No Yes   Sig: Take 1 tablet by mouth daily   cloNIDine (CATAPRES) 0.1 MG tablet 2025  Yes Yes   Sig: Take 1 tablet by mouth 2 times daily   tamsulosin (FLOMAX) 0.4 MG capsule 2025  No Yes   Sig: Take 1 capsule by mouth daily      Facility-Administered Medications: None       Hospital Medications:  No current facility-administered medications for this encounter.       Social History:  Social History     Tobacco Use    Smoking status: Never     Passive exposure: Never    Smokeless tobacco: Never   Substance Use Topics    Alcohol use: Not Currently     Comment: Occasionally       Family History:  Family History   Problem Relation Age of Onset    Diabetes type 2  Father              Review of Systems:      Constitutional: negative fever, negative chills, negative weight loss  Eyes:   negative visual changes  ENT:   negative sore throat, tongue or lip swelling  Respiratory:  negative cough,

## 2025-01-23 NOTE — PERIOP NOTE
Message left at [phone] stating to call re: scheduled procedure.   identified mailbox as \"juan [email or web address].\"

## 2025-01-23 NOTE — DISCHARGE INSTRUCTIONS
2025    Anthony Santos JrDarrly  :  1965  [facility] Medical Record Number:  [id number]      COLONOSCOPY FINDINGS:  Your colonoscopy showed: 1 polyp removed and diverticulosis noted.    DISCOMFORT:  Redness at IV site- apply warm compress to area; if redness or soreness persist- contact your physician  There may be a slight amount of blood passed from the rectum  Gaseous discomfort- walking, belching will help relieve any discomfort  You may not operate a vehicle for 12 hours  You may not engage in an occupation involving machinery or appliances for rest of today  You may not drink alcoholic beverages for at least 12 hours  Avoid making any critical decisions for at least 24 hour  DIET:   regular   - however -  remember your colon is empty and a heavy meal will produce gas.   Avoid these foods:  vegetables, fried / greasy foods, carbonated drinks for today     ACTIVITY:  You may resume your normal daily activities it is recommended that you spend the remainder of the day resting -  avoid any strenuous activity.    CALL M.D.  ANY SIGN OF:   Increasing pain, nausea, vomiting  Abdominal distension (swelling)  New increased bleeding (oral or rectal)  Fever (chills)  Pain in chest area  Bloody discharge from nose or mouth   Shortness of breath    Follow-up Instructions:   Call Dr. Krueger if any questions or problems.   Telephone # [phone]  Biopsy results will be available in  5 to 7 days  Should have a repeat colonoscopy in 7-10 years.

## 2025-02-05 ENCOUNTER — TELEPHONE (OUTPATIENT)
Age: 60
End: 2025-02-05

## 2025-02-05 NOTE — TELEPHONE ENCOUNTER
Pt called in requesting to reschedule his upcoming appt. Pt stated he had transportation issues. Pt is rescheduled to come in on 3/19. FYI

## 2025-03-19 ENCOUNTER — OFFICE VISIT (OUTPATIENT)
Age: 60
End: 2025-03-19
Payer: MEDICAID

## 2025-03-19 ENCOUNTER — TELEPHONE (OUTPATIENT)
Age: 60
End: 2025-03-19

## 2025-03-19 VITALS
DIASTOLIC BLOOD PRESSURE: 133 MMHG | SYSTOLIC BLOOD PRESSURE: 186 MMHG | BODY MASS INDEX: 28.14 KG/M2 | WEIGHT: 201 LBS | OXYGEN SATURATION: 100 % | TEMPERATURE: 97 F | HEIGHT: 71 IN | RESPIRATION RATE: 16 BRPM | HEART RATE: 64 BPM

## 2025-03-19 DIAGNOSIS — N18.9 CHRONIC KIDNEY DISEASE, UNSPECIFIED CKD STAGE: ICD-10-CM

## 2025-03-19 DIAGNOSIS — I82.532 CHRONIC DEEP VEIN THROMBOSIS (DVT) OF POPLITEAL VEIN OF LEFT LOWER EXTREMITY: Primary | ICD-10-CM

## 2025-03-19 DIAGNOSIS — I10 HYPERTENSION, UNSPECIFIED TYPE: ICD-10-CM

## 2025-03-19 DIAGNOSIS — R03.0 ELEVATED BLOOD PRESSURE READING: ICD-10-CM

## 2025-03-19 DIAGNOSIS — M54.50 LUMBAR BACK PAIN: ICD-10-CM

## 2025-03-19 PROCEDURE — 99204 OFFICE O/P NEW MOD 45 MIN: CPT | Performed by: NURSE PRACTITIONER

## 2025-03-19 PROCEDURE — 3080F DIAST BP >= 90 MM HG: CPT | Performed by: NURSE PRACTITIONER

## 2025-03-19 PROCEDURE — 3077F SYST BP >= 140 MM HG: CPT | Performed by: NURSE PRACTITIONER

## 2025-03-19 ASSESSMENT — PATIENT HEALTH QUESTIONNAIRE - PHQ9
SUM OF ALL RESPONSES TO PHQ QUESTIONS 1-9: 2
SUM OF ALL RESPONSES TO PHQ QUESTIONS 1-9: 2
2. FEELING DOWN, DEPRESSED OR HOPELESS: SEVERAL DAYS
SUM OF ALL RESPONSES TO PHQ QUESTIONS 1-9: 2
SUM OF ALL RESPONSES TO PHQ QUESTIONS 1-9: 2
1. LITTLE INTEREST OR PLEASURE IN DOING THINGS: SEVERAL DAYS

## 2025-03-19 NOTE — PROGRESS NOTES
Anthony Santos Jr. is a 60 y.o. male new patient for evaluation of DVT. Patient was referred by his PCP Dr. Collins.        1. Have you been to the ER, urgent care clinic since your last visit?  Hospitalized since your last visit?{no    2. Have you seen or consulted any other health care providers outside of the [facility] since your last visit?  Include any pap smears or colon screening. no  
CP, headache, nausea or dizziness. Reports he has not been taking his blood pressure medication. Reports chronic low back pain, asks about management. He is applying lidocaine patches. Has not established with nephrology. Denies dysuria, hematuria or urinary frequency.     Presents alone today.     Past Medical History:   Diagnosis Date    Arthritis     Chronic kidney disease     DVT (deep venous thrombosis) ([facility]) 2023    Left leg---date is approximate    Hyperlipidemia     Hypertension       Past Surgical History:   Procedure Laterality Date    BICEPS TENDON REPAIR Right     COLONOSCOPY N/A 1/23/2025    COLONOSCOPY WITH REMOVAL POLYP SNARE/BIOPSY FORCEPS performed by West Krueger MD at [facility] ENDOSCOPY      Social History     Tobacco Use    Smoking status: Never     Passive exposure: Never    Smokeless tobacco: Never   Substance Use Topics    Alcohol use: Not Currently     Comment: Occasionally      Family History   Problem Relation Age of Onset    Diabetes type 2  Father      Current Outpatient Medications   Medication Sig    cloNIDine (CATAPRES) 0.1 MG tablet Take 1 tablet by mouth 2 times daily    tamsulosin (FLOMAX) 0.4 MG capsule Take 1 capsule by mouth daily    amLODIPine (NORVASC) 5 MG tablet Take 1 tablet by mouth daily    acetaminophen (TYLENOL) 500 MG tablet Take 2 tablets by mouth 3 times daily as needed for Pain     No current facility-administered medications for this visit.      No Known Allergies     Review of Systems: A complete review of systems was obtained, negative except as described above.    Physical Exam:   Blood pressure (!) 186/133, pulse 64, temperature 97 °F (36.1 °C), temperature source Temporal, resp. rate 16, height 1.803 m (5' 11\"), weight 91.2 kg (201 lb), SpO2 100%.    ECOG PS: 0  General: Well developed, no acute distress  Eyes: EOMI, anicteric sclerae  HENT: Atraumatic, OP clear  Neck: Supple, no JVD or thyromegaly  Lymphatic: No cervical, supraclavicular adenopathy  Respiratory:

## 2025-03-19 NOTE — TELEPHONE ENCOUNTER
03/19/25 4:56 PM NA x 1, left VM. Called pt to ask a few follow up questions from appt today. Need to confirm when he stopped eliquis after 4/2024 ER visit and encourage him to schedule US within next 1-2 weeks.

## 2025-03-26 ENCOUNTER — TELEPHONE (OUTPATIENT)
Age: 60
End: 2025-03-26

## 2025-03-26 NOTE — TELEPHONE ENCOUNTER
03/26/25 1:07 PM     Received left lower extremity venous US report from [facility] and reviewed results:   4/30/23 left lower extremity venous duplex scan report:   Impression: No evidence of superficial venous thrombosis in left lower extremity. Acute DVT noted in left popliteal vein, posterior tibial vein, peroneal vein and gastrocnemius vein     [facility] ER visit:   4/12/24 vascular duplex lower extremity venous, left:   Left lower extremity venous duplex positive for age indeterminate deep venous thrombosis of the distal popliteal vein. Right common femoral vein is thrombus free.      Discussed with patient I reviewed the left lower extremity venous duplex scan report from [facility] completed on 4/30/23 and he had an acute clot in the left popliteal vein. When he had repeat US on 4/12/24 for leg pain, he was found to have age indeterminate DVT in distal popliteal vein. Advised this is likely the same location as prior DVT, and likely represents a chronic DVT, rather than recurrent DVT. Given he restarted eliquis after 4/2024 DVT finding, and then discontinued in Nov 2024, I recommend re-evaluation to determine if clot remains present or if it resolved with resumption of anticoagulation. I do not recommend restarting eliquis until after I am able to review repeat ultrasound results. He verbalized understanding.

## 2025-04-06 ENCOUNTER — HOSPITAL ENCOUNTER (INPATIENT)
Facility: HOSPITAL | Age: 60
LOS: 4 days | Discharge: HOME | DRG: 469 | End: 2025-04-10
Attending: EMERGENCY MEDICINE | Admitting: HOSPITALIST
Payer: MEDICAID

## 2025-04-06 ENCOUNTER — APPOINTMENT (OUTPATIENT)
Facility: HOSPITAL | Age: 60
DRG: 469 | End: 2025-04-06
Payer: MEDICAID

## 2025-04-06 DIAGNOSIS — K85.20 ALCOHOL-INDUCED ACUTE PANCREATITIS, UNSPECIFIED COMPLICATION STATUS: ICD-10-CM

## 2025-04-06 DIAGNOSIS — N17.9 ACUTE RENAL FAILURE, UNSPECIFIED ACUTE RENAL FAILURE TYPE: ICD-10-CM

## 2025-04-06 DIAGNOSIS — N17.9 AKI (ACUTE KIDNEY INJURY): Primary | ICD-10-CM

## 2025-04-06 LAB
ALBUMIN SERPL-MCNC: 3.6 G/DL (ref 3.5–5)
ALBUMIN/GLOB SERPL: 0.7 (ref 1.1–2.2)
ALP SERPL-CCNC: 104 U/L (ref 45–117)
ALT SERPL-CCNC: 153 U/L (ref 12–78)
ANION GAP BLD CALC-SCNC: 11 (ref 10–20)
ANION GAP SERPL CALC-SCNC: 16 MMOL/L (ref 2–12)
APPEARANCE UR: CLEAR
AST SERPL-CCNC: 47 U/L (ref 15–37)
BACTERIA URNS QL MICRO: NEGATIVE /HPF
BASE DEFICIT BLD-SCNC: 7.2 MMOL/L
BASOPHILS # BLD: 0.01 K/UL (ref 0–0.1)
BASOPHILS NFR BLD: 0.1 % (ref 0–1)
BILIRUB SERPL-MCNC: 0.6 MG/DL (ref 0.2–1)
BILIRUB UR QL: NEGATIVE
BUN SERPL-MCNC: 142 MG/DL (ref 6–20)
BUN/CREAT SERPL: 11 (ref 12–20)
CA-I BLD-MCNC: 1.12 MMOL/L (ref 1.15–1.33)
CALCIUM SERPL-MCNC: 9.6 MG/DL (ref 8.5–10.1)
CHLORIDE BLD-SCNC: 104 MMOL/L (ref 100–111)
CHLORIDE SERPL-SCNC: 97 MMOL/L (ref 97–108)
CO2 BLD-SCNC: 18 MMOL/L (ref 22–29)
CO2 SERPL-SCNC: 18 MMOL/L (ref 21–32)
COLOR UR: ABNORMAL
CREAT SERPL-MCNC: 12.7 MG/DL (ref 0.7–1.3)
CREAT UR-MCNC: 12.1 MG/DL (ref 0.6–1.3)
CREAT UR-MCNC: 180 MG/DL
DIFFERENTIAL METHOD BLD: ABNORMAL
EOSINOPHIL # BLD: 0 K/UL (ref 0–0.4)
EOSINOPHIL NFR BLD: 0 % (ref 0–7)
EPITH CASTS URNS QL MICRO: ABNORMAL /LPF
ERYTHROCYTE [DISTWIDTH] IN BLOOD BY AUTOMATED COUNT: 14.7 % (ref 11.5–14.5)
ETHANOL SERPL-MCNC: <10 MG/DL (ref 0–0.08)
FLUAV RNA SPEC QL NAA+PROBE: NOT DETECTED
FLUBV RNA SPEC QL NAA+PROBE: NOT DETECTED
GLOBULIN SER CALC-MCNC: 5.2 G/DL (ref 2–4)
GLUCOSE BLD STRIP.AUTO-MCNC: 79 MG/DL (ref 74–99)
GLUCOSE SERPL-MCNC: 167 MG/DL (ref 65–100)
GLUCOSE UR STRIP.AUTO-MCNC: NEGATIVE MG/DL
HCO3 BLDA-SCNC: 19 MMOL/L
HCT VFR BLD AUTO: 46.7 % (ref 36.6–50.3)
HGB BLD-MCNC: 16 G/DL (ref 12.1–17)
HGB UR QL STRIP: ABNORMAL
HYALINE CASTS URNS QL MICRO: ABNORMAL /LPF (ref 0–2)
IMM GRANULOCYTES # BLD AUTO: 0.03 K/UL (ref 0–0.04)
IMM GRANULOCYTES NFR BLD AUTO: 0.3 % (ref 0–0.5)
KETONES UR QL STRIP.AUTO: NEGATIVE MG/DL
LACTATE BLD-SCNC: 1.49 MMOL/L (ref 0.4–2)
LEUKOCYTE ESTERASE UR QL STRIP.AUTO: NEGATIVE
LIPASE SERPL-CCNC: 571 U/L (ref 13–75)
LYMPHOCYTES # BLD: 1.14 K/UL (ref 0.8–3.5)
LYMPHOCYTES NFR BLD: 12.5 % (ref 12–49)
MCH RBC QN AUTO: 27.4 PG (ref 26–34)
MCHC RBC AUTO-ENTMCNC: 34.3 G/DL (ref 30–36.5)
MCV RBC AUTO: 79.8 FL (ref 80–99)
MONOCYTES # BLD: 1.12 K/UL (ref 0–1)
MONOCYTES NFR BLD: 12.3 % (ref 5–13)
NEUTS SEG # BLD: 6.79 K/UL (ref 1.8–8)
NEUTS SEG NFR BLD: 74.8 % (ref 32–75)
NITRITE UR QL STRIP.AUTO: NEGATIVE
NRBC # BLD: 0 K/UL (ref 0–0.01)
NRBC BLD-RTO: 0 PER 100 WBC
NT PRO BNP: 378 PG/ML
PCO2 BLDV: 39.7 MMHG (ref 41–51)
PH BLDV: 7.29 (ref 7.32–7.42)
PH UR STRIP: 5 (ref 5–8)
PLATELET # BLD AUTO: 179 K/UL (ref 150–400)
PMV BLD AUTO: 9.9 FL (ref 8.9–12.9)
PO2 BLDV: <27 MMHG (ref 25–40)
POTASSIUM BLD-SCNC: 5 MMOL/L (ref 3.5–5.5)
POTASSIUM SERPL-SCNC: 4.5 MMOL/L (ref 3.5–5.1)
PROT SERPL-MCNC: 8.8 G/DL (ref 6.4–8.2)
PROT UR STRIP-MCNC: ABNORMAL MG/DL
PROT UR-MCNC: 32 MG/DL (ref 0–11.9)
RBC # BLD AUTO: 5.85 M/UL (ref 4.1–5.7)
RBC #/AREA URNS HPF: ABNORMAL /HPF (ref 0–5)
SARS-COV-2 RNA RESP QL NAA+PROBE: NOT DETECTED
SODIUM BLD-SCNC: 133 MMOL/L (ref 136–145)
SODIUM SERPL-SCNC: 131 MMOL/L (ref 136–145)
SOURCE: NORMAL
SP GR UR REFRACTOMETRY: 1.01 (ref 1–1.03)
SPECIMEN SITE: ABNORMAL
TROPONIN I SERPL HS-MCNC: 10 NG/L (ref 0–76)
URINE CULTURE IF INDICATED: ABNORMAL
UROBILINOGEN UR QL STRIP.AUTO: 0.2 EU/DL (ref 0.2–1)
WBC # BLD AUTO: 9.1 K/UL (ref 4.1–11.1)
WBC URNS QL MICRO: ABNORMAL /HPF (ref 0–4)

## 2025-04-06 PROCEDURE — 84300 ASSAY OF URINE SODIUM: CPT

## 2025-04-06 PROCEDURE — 93005 ELECTROCARDIOGRAM TRACING: CPT | Performed by: EMERGENCY MEDICINE

## 2025-04-06 PROCEDURE — 2580000003 HC RX 258: Performed by: EMERGENCY MEDICINE

## 2025-04-06 PROCEDURE — 81001 URINALYSIS AUTO W/SCOPE: CPT

## 2025-04-06 PROCEDURE — 94761 N-INVAS EAR/PLS OXIMETRY MLT: CPT

## 2025-04-06 PROCEDURE — 83690 ASSAY OF LIPASE: CPT

## 2025-04-06 PROCEDURE — 82947 ASSAY GLUCOSE BLOOD QUANT: CPT

## 2025-04-06 PROCEDURE — 51702 INSERT TEMP BLADDER CATH: CPT

## 2025-04-06 PROCEDURE — 6360000002 HC RX W HCPCS: Performed by: HOSPITALIST

## 2025-04-06 PROCEDURE — 99285 EMERGENCY DEPT VISIT HI MDM: CPT

## 2025-04-06 PROCEDURE — 84295 ASSAY OF SERUM SODIUM: CPT

## 2025-04-06 PROCEDURE — 84484 ASSAY OF TROPONIN QUANT: CPT

## 2025-04-06 PROCEDURE — 82570 ASSAY OF URINE CREATININE: CPT

## 2025-04-06 PROCEDURE — 80053 COMPREHEN METABOLIC PANEL: CPT

## 2025-04-06 PROCEDURE — 82330 ASSAY OF CALCIUM: CPT

## 2025-04-06 PROCEDURE — 1100000000 HC RM PRIVATE

## 2025-04-06 PROCEDURE — 71046 X-RAY EXAM CHEST 2 VIEWS: CPT

## 2025-04-06 PROCEDURE — 84156 ASSAY OF PROTEIN URINE: CPT

## 2025-04-06 PROCEDURE — 84132 ASSAY OF SERUM POTASSIUM: CPT

## 2025-04-06 PROCEDURE — 87636 SARSCOV2 & INF A&B AMP PRB: CPT

## 2025-04-06 PROCEDURE — 36415 COLL VENOUS BLD VENIPUNCTURE: CPT

## 2025-04-06 PROCEDURE — 83880 ASSAY OF NATRIURETIC PEPTIDE: CPT

## 2025-04-06 PROCEDURE — 74176 CT ABD & PELVIS W/O CONTRAST: CPT

## 2025-04-06 PROCEDURE — 82043 UR ALBUMIN QUANTITATIVE: CPT

## 2025-04-06 PROCEDURE — 82077 ASSAY SPEC XCP UR&BREATH IA: CPT

## 2025-04-06 PROCEDURE — 84478 ASSAY OF TRIGLYCERIDES: CPT

## 2025-04-06 PROCEDURE — 85025 COMPLETE CBC W/AUTO DIFF WBC: CPT

## 2025-04-06 PROCEDURE — 82803 BLOOD GASES ANY COMBINATION: CPT

## 2025-04-06 RX ORDER — ONDANSETRON 2 MG/ML
4 INJECTION INTRAMUSCULAR; INTRAVENOUS EVERY 6 HOURS PRN
Status: DISCONTINUED | OUTPATIENT
Start: 2025-04-06 | End: 2025-04-10 | Stop reason: HOSPADM

## 2025-04-06 RX ORDER — 0.9 % SODIUM CHLORIDE 0.9 %
1000 INTRAVENOUS SOLUTION INTRAVENOUS ONCE
Status: COMPLETED | OUTPATIENT
Start: 2025-04-06 | End: 2025-04-06

## 2025-04-06 RX ORDER — SODIUM CHLORIDE 0.9 % (FLUSH) 0.9 %
5-40 SYRINGE (ML) INJECTION PRN
Status: DISCONTINUED | OUTPATIENT
Start: 2025-04-06 | End: 2025-04-10 | Stop reason: HOSPADM

## 2025-04-06 RX ORDER — ACETAMINOPHEN 650 MG/1
650 SUPPOSITORY RECTAL EVERY 6 HOURS PRN
Status: DISCONTINUED | OUTPATIENT
Start: 2025-04-06 | End: 2025-04-10 | Stop reason: HOSPADM

## 2025-04-06 RX ORDER — HEPARIN SODIUM 5000 [USP'U]/ML
5000 INJECTION, SOLUTION INTRAVENOUS; SUBCUTANEOUS EVERY 8 HOURS SCHEDULED
Status: DISCONTINUED | OUTPATIENT
Start: 2025-04-06 | End: 2025-04-10 | Stop reason: HOSPADM

## 2025-04-06 RX ORDER — ONDANSETRON 4 MG/1
4 TABLET, ORALLY DISINTEGRATING ORAL EVERY 8 HOURS PRN
Status: DISCONTINUED | OUTPATIENT
Start: 2025-04-06 | End: 2025-04-10 | Stop reason: HOSPADM

## 2025-04-06 RX ORDER — SODIUM CHLORIDE 0.9 % (FLUSH) 0.9 %
5-40 SYRINGE (ML) INJECTION EVERY 12 HOURS SCHEDULED
Status: DISCONTINUED | OUTPATIENT
Start: 2025-04-06 | End: 2025-04-10 | Stop reason: HOSPADM

## 2025-04-06 RX ORDER — SODIUM CHLORIDE 9 MG/ML
INJECTION, SOLUTION INTRAVENOUS CONTINUOUS
Status: DISCONTINUED | OUTPATIENT
Start: 2025-04-06 | End: 2025-04-07

## 2025-04-06 RX ORDER — ACETAMINOPHEN 325 MG/1
650 TABLET ORAL EVERY 6 HOURS PRN
Status: DISCONTINUED | OUTPATIENT
Start: 2025-04-06 | End: 2025-04-10 | Stop reason: HOSPADM

## 2025-04-06 RX ORDER — POLYETHYLENE GLYCOL 3350 17 G/17G
17 POWDER, FOR SOLUTION ORAL DAILY PRN
Status: DISCONTINUED | OUTPATIENT
Start: 2025-04-06 | End: 2025-04-10 | Stop reason: HOSPADM

## 2025-04-06 RX ORDER — SODIUM CHLORIDE 9 MG/ML
INJECTION, SOLUTION INTRAVENOUS PRN
Status: DISCONTINUED | OUTPATIENT
Start: 2025-04-06 | End: 2025-04-10 | Stop reason: HOSPADM

## 2025-04-06 RX ADMIN — SODIUM CHLORIDE: 0.9 INJECTION, SOLUTION INTRAVENOUS at 23:20

## 2025-04-06 RX ADMIN — SODIUM CHLORIDE 1000 ML: 0.9 INJECTION, SOLUTION INTRAVENOUS at 21:26

## 2025-04-06 RX ADMIN — HEPARIN SODIUM 5000 UNITS: 5000 INJECTION INTRAVENOUS; SUBCUTANEOUS at 23:20

## 2025-04-06 ASSESSMENT — PAIN SCALES - GENERAL: PAINLEVEL_OUTOF10: 9

## 2025-04-06 ASSESSMENT — PAIN - FUNCTIONAL ASSESSMENT: PAIN_FUNCTIONAL_ASSESSMENT: 0-10

## 2025-04-06 ASSESSMENT — PAIN DESCRIPTION - LOCATION: LOCATION: BACK

## 2025-04-06 NOTE — ED TRIAGE NOTES
Pt ambulatory to ED with c/o bilateral flank pain, and generalized weakness for the last few months. Pt expresses concern for HTN /95 in triage. Pt states he had not been taking amlodipine for a while but started again 2 weeks ago. Pt also states he had been drinking a lot recently but stopped approximately 1 week ago. Pt very vague in triage not directly answering questions.

## 2025-04-07 ENCOUNTER — APPOINTMENT (OUTPATIENT)
Dept: VASCULAR SURGERY | Facility: HOSPITAL | Age: 60
DRG: 469 | End: 2025-04-07
Payer: MEDICAID

## 2025-04-07 ENCOUNTER — APPOINTMENT (OUTPATIENT)
Facility: HOSPITAL | Age: 60
DRG: 469 | End: 2025-04-07
Payer: MEDICAID

## 2025-04-07 LAB
ALBUMIN SERPL-MCNC: 3.2 G/DL (ref 3.5–5)
ALBUMIN/GLOB SERPL: 0.8 (ref 1.1–2.2)
ALP SERPL-CCNC: 97 U/L (ref 45–117)
ALT SERPL-CCNC: 132 U/L (ref 12–78)
AMPHET UR QL SCN: NEGATIVE
ANION GAP SERPL CALC-SCNC: 17 MMOL/L (ref 2–12)
AST SERPL-CCNC: 44 U/L (ref 15–37)
BARBITURATES UR QL SCN: NEGATIVE
BASE DEFICIT BLDV-SCNC: 7.9 MMOL/L
BASOPHILS # BLD: 0.02 K/UL (ref 0–0.1)
BASOPHILS NFR BLD: 0.2 % (ref 0–1)
BDY SITE: ABNORMAL
BENZODIAZ UR QL: NEGATIVE
BILIRUB SERPL-MCNC: 1 MG/DL (ref 0.2–1)
BUN SERPL-MCNC: 140 MG/DL (ref 6–20)
BUN/CREAT SERPL: 11 (ref 12–20)
CALCIUM SERPL-MCNC: 8.7 MG/DL (ref 8.5–10.1)
CALCIUM SERPL-MCNC: 9.1 MG/DL (ref 8.5–10.1)
CANNABINOIDS UR QL SCN: NEGATIVE
CHLORIDE SERPL-SCNC: 101 MMOL/L (ref 97–108)
CK SERPL-CCNC: 1032 U/L (ref 39–308)
CO2 SERPL-SCNC: 16 MMOL/L (ref 21–32)
COCAINE UR QL SCN: POSITIVE
COMMENT:: NORMAL
CREAT SERPL-MCNC: 12.6 MG/DL (ref 0.7–1.3)
CREAT UR-MCNC: 180 MG/DL
DIFFERENTIAL METHOD BLD: ABNORMAL
EKG ATRIAL RATE: 87 BPM
EKG DIAGNOSIS: NORMAL
EKG P AXIS: 25 DEGREES
EKG P-R INTERVAL: 190 MS
EKG Q-T INTERVAL: 342 MS
EKG QRS DURATION: 88 MS
EKG QTC CALCULATION (BAZETT): 411 MS
EKG R AXIS: -52 DEGREES
EKG T AXIS: 11 DEGREES
EKG VENTRICULAR RATE: 87 BPM
EOSINOPHIL # BLD: 0.02 K/UL (ref 0–0.4)
EOSINOPHIL NFR BLD: 0.2 % (ref 0–7)
ERYTHROCYTE [DISTWIDTH] IN BLOOD BY AUTOMATED COUNT: 14.8 % (ref 11.5–14.5)
GLOBULIN SER CALC-MCNC: 4.2 G/DL (ref 2–4)
GLUCOSE BLD STRIP.AUTO-MCNC: 101 MG/DL (ref 65–117)
GLUCOSE BLD STRIP.AUTO-MCNC: 104 MG/DL (ref 65–117)
GLUCOSE BLD STRIP.AUTO-MCNC: 149 MG/DL (ref 65–117)
GLUCOSE BLD STRIP.AUTO-MCNC: 193 MG/DL (ref 65–117)
GLUCOSE BLD STRIP.AUTO-MCNC: 89 MG/DL (ref 65–117)
GLUCOSE SERPL-MCNC: 96 MG/DL (ref 65–100)
HBV SURFACE AB SER QL: NONREACTIVE
HBV SURFACE AB SER-ACNC: <3.1 MIU/ML
HBV SURFACE AG SER QL: <0.1 INDEX
HBV SURFACE AG SER QL: NEGATIVE
HCO3 BLDV-SCNC: 18 MMOL/L (ref 23–28)
HCT VFR BLD AUTO: 47.3 % (ref 36.6–50.3)
HCV AB SER IA-ACNC: 0.08 INDEX
HCV AB SERPL QL IA: NONREACTIVE
HGB BLD-MCNC: 15.9 G/DL (ref 12.1–17)
IMM GRANULOCYTES # BLD AUTO: 0.05 K/UL (ref 0–0.04)
IMM GRANULOCYTES NFR BLD AUTO: 0.5 % (ref 0–0.5)
LACTATE SERPL-SCNC: 1.6 MMOL/L (ref 0.4–2)
LIPASE SERPL-CCNC: 507 U/L (ref 13–75)
LYMPHOCYTES # BLD: 1.31 K/UL (ref 0.8–3.5)
LYMPHOCYTES NFR BLD: 13.7 % (ref 12–49)
Lab: ABNORMAL
MCH RBC QN AUTO: 27.3 PG (ref 26–34)
MCHC RBC AUTO-ENTMCNC: 33.6 G/DL (ref 30–36.5)
MCV RBC AUTO: 81.1 FL (ref 80–99)
METHADONE UR QL: NEGATIVE
MICROALBUMIN UR-MCNC: 3.7 MG/DL
MICROALBUMIN/CREAT UR-RTO: 21 MG/G (ref 0–30)
MONOCYTES # BLD: 1.44 K/UL (ref 0–1)
MONOCYTES NFR BLD: 15 % (ref 5–13)
NEUTS SEG # BLD: 6.74 K/UL (ref 1.8–8)
NEUTS SEG NFR BLD: 70.4 % (ref 32–75)
NRBC # BLD: 0 K/UL (ref 0–0.01)
NRBC BLD-RTO: 0 PER 100 WBC
OPIATES UR QL: POSITIVE
PCO2 BLDV: 36.7 MMHG (ref 41–51)
PCP UR QL: NEGATIVE
PH BLDV: 7.3 (ref 7.32–7.42)
PHOSPHATE SERPL-MCNC: 6.6 MG/DL (ref 2.6–4.7)
PLATELET # BLD AUTO: 166 K/UL (ref 150–400)
PMV BLD AUTO: 10.2 FL (ref 8.9–12.9)
PO2 BLDV: 40 MMHG (ref 25–40)
POTASSIUM SERPL-SCNC: 4.8 MMOL/L (ref 3.5–5.1)
PROT SERPL-MCNC: 7.4 G/DL (ref 6.4–8.2)
PTH-INTACT SERPL-MCNC: 367.8 PG/ML (ref 18.4–88)
RBC # BLD AUTO: 5.83 M/UL (ref 4.1–5.7)
RHEUMATOID FACT SERPL-ACNC: <10 IU/ML
SAO2 % BLDV: 70 % (ref 65–88)
SAO2% DEVICE SAO2% SENSOR NAME: ABNORMAL
SERVICE CMNT-IMP: ABNORMAL
SERVICE CMNT-IMP: ABNORMAL
SERVICE CMNT-IMP: NORMAL
SODIUM SERPL-SCNC: 134 MMOL/L (ref 136–145)
SODIUM UR-SCNC: 34 MMOL/L
SPECIMEN HOLD: NORMAL
SPECIMEN SITE: ABNORMAL
TRIGL SERPL-MCNC: 95 MG/DL
TRIGL SERPL-MCNC: 97 MG/DL
WBC # BLD AUTO: 9.6 K/UL (ref 4.1–11.1)

## 2025-04-07 PROCEDURE — 86160 COMPLEMENT ANTIGEN: CPT

## 2025-04-07 PROCEDURE — 80307 DRUG TEST PRSMV CHEM ANLYZR: CPT

## 2025-04-07 PROCEDURE — 86706 HEP B SURFACE ANTIBODY: CPT

## 2025-04-07 PROCEDURE — 51702 INSERT TEMP BLADDER CATH: CPT

## 2025-04-07 PROCEDURE — 84478 ASSAY OF TRIGLYCERIDES: CPT

## 2025-04-07 PROCEDURE — 84165 PROTEIN E-PHORESIS SERUM: CPT

## 2025-04-07 PROCEDURE — 86334 IMMUNOFIX E-PHORESIS SERUM: CPT

## 2025-04-07 PROCEDURE — 84100 ASSAY OF PHOSPHORUS: CPT

## 2025-04-07 PROCEDURE — 6360000002 HC RX W HCPCS: Performed by: HOSPITALIST

## 2025-04-07 PROCEDURE — 82803 BLOOD GASES ANY COMBINATION: CPT

## 2025-04-07 PROCEDURE — 2500000003 HC RX 250 WO HCPCS: Performed by: HOSPITALIST

## 2025-04-07 PROCEDURE — 36415 COLL VENOUS BLD VENIPUNCTURE: CPT

## 2025-04-07 PROCEDURE — 83690 ASSAY OF LIPASE: CPT

## 2025-04-07 PROCEDURE — 82962 GLUCOSE BLOOD TEST: CPT

## 2025-04-07 PROCEDURE — 83605 ASSAY OF LACTIC ACID: CPT

## 2025-04-07 PROCEDURE — 80053 COMPREHEN METABOLIC PANEL: CPT

## 2025-04-07 PROCEDURE — 86803 HEPATITIS C AB TEST: CPT

## 2025-04-07 PROCEDURE — 6370000000 HC RX 637 (ALT 250 FOR IP): Performed by: HOSPITALIST

## 2025-04-07 PROCEDURE — 2580000003 HC RX 258: Performed by: INTERNAL MEDICINE

## 2025-04-07 PROCEDURE — 2500000003 HC RX 250 WO HCPCS: Performed by: INTERNAL MEDICINE

## 2025-04-07 PROCEDURE — 93975 VASCULAR STUDY: CPT

## 2025-04-07 PROCEDURE — 86235 NUCLEAR ANTIGEN ANTIBODY: CPT

## 2025-04-07 PROCEDURE — 83516 IMMUNOASSAY NONANTIBODY: CPT

## 2025-04-07 PROCEDURE — 76770 US EXAM ABDO BACK WALL COMP: CPT

## 2025-04-07 PROCEDURE — 86037 ANCA TITER EACH ANTIBODY: CPT

## 2025-04-07 PROCEDURE — 82784 ASSAY IGA/IGD/IGG/IGM EACH: CPT

## 2025-04-07 PROCEDURE — 83970 ASSAY OF PARATHORMONE: CPT

## 2025-04-07 PROCEDURE — 82550 ASSAY OF CK (CPK): CPT

## 2025-04-07 PROCEDURE — 87340 HEPATITIS B SURFACE AG IA: CPT

## 2025-04-07 PROCEDURE — 85025 COMPLETE CBC W/AUTO DIFF WBC: CPT

## 2025-04-07 PROCEDURE — 94761 N-INVAS EAR/PLS OXIMETRY MLT: CPT

## 2025-04-07 PROCEDURE — 2580000003 HC RX 258: Performed by: HOSPITALIST

## 2025-04-07 PROCEDURE — 86431 RHEUMATOID FACTOR QUANT: CPT

## 2025-04-07 PROCEDURE — 86038 ANTINUCLEAR ANTIBODIES: CPT

## 2025-04-07 PROCEDURE — 86225 DNA ANTIBODY NATIVE: CPT

## 2025-04-07 PROCEDURE — 83521 IG LIGHT CHAINS FREE EACH: CPT

## 2025-04-07 PROCEDURE — 93010 ELECTROCARDIOGRAM REPORT: CPT | Performed by: SPECIALIST

## 2025-04-07 PROCEDURE — 1100000000 HC RM PRIVATE

## 2025-04-07 RX ORDER — AMLODIPINE BESYLATE 5 MG/1
5 TABLET ORAL DAILY
Status: DISCONTINUED | OUTPATIENT
Start: 2025-04-07 | End: 2025-04-09

## 2025-04-07 RX ORDER — TAMSULOSIN HYDROCHLORIDE 0.4 MG/1
0.4 CAPSULE ORAL DAILY
Status: DISCONTINUED | OUTPATIENT
Start: 2025-04-07 | End: 2025-04-10 | Stop reason: HOSPADM

## 2025-04-07 RX ORDER — INSULIN LISPRO 100 [IU]/ML
0-4 INJECTION, SOLUTION INTRAVENOUS; SUBCUTANEOUS EVERY 6 HOURS
Status: DISCONTINUED | OUTPATIENT
Start: 2025-04-07 | End: 2025-04-08

## 2025-04-07 RX ADMIN — TAMSULOSIN HYDROCHLORIDE 0.4 MG: 0.4 CAPSULE ORAL at 08:54

## 2025-04-07 RX ADMIN — SODIUM CHLORIDE 40 MG: 9 INJECTION INTRAMUSCULAR; INTRAVENOUS; SUBCUTANEOUS at 08:55

## 2025-04-07 RX ADMIN — HEPARIN SODIUM 5000 UNITS: 5000 INJECTION INTRAVENOUS; SUBCUTANEOUS at 20:46

## 2025-04-07 RX ADMIN — HEPARIN SODIUM 5000 UNITS: 5000 INJECTION INTRAVENOUS; SUBCUTANEOUS at 05:53

## 2025-04-07 RX ADMIN — HYDROMORPHONE HYDROCHLORIDE 0.5 MG: 1 INJECTION, SOLUTION INTRAMUSCULAR; INTRAVENOUS; SUBCUTANEOUS at 20:59

## 2025-04-07 RX ADMIN — HEPARIN SODIUM 5000 UNITS: 5000 INJECTION INTRAVENOUS; SUBCUTANEOUS at 13:09

## 2025-04-07 RX ADMIN — SODIUM CHLORIDE, PRESERVATIVE FREE 10 ML: 5 INJECTION INTRAVENOUS at 19:41

## 2025-04-07 RX ADMIN — INSULIN LISPRO 1 UNITS: 100 INJECTION, SOLUTION INTRAVENOUS; SUBCUTANEOUS at 13:09

## 2025-04-07 RX ADMIN — SODIUM BICARBONATE: 84 INJECTION, SOLUTION INTRAVENOUS at 08:57

## 2025-04-07 RX ADMIN — SODIUM BICARBONATE: 84 INJECTION, SOLUTION INTRAVENOUS at 17:36

## 2025-04-07 RX ADMIN — SODIUM CHLORIDE, PRESERVATIVE FREE 10 ML: 5 INJECTION INTRAVENOUS at 08:59

## 2025-04-07 RX ADMIN — AMLODIPINE BESYLATE 5 MG: 5 TABLET ORAL at 08:54

## 2025-04-07 ASSESSMENT — PAIN SCALES - GENERAL: PAINLEVEL_OUTOF10: 6

## 2025-04-07 ASSESSMENT — PAIN DESCRIPTION - DESCRIPTORS: DESCRIPTORS: ACHING

## 2025-04-07 ASSESSMENT — PAIN DESCRIPTION - ORIENTATION: ORIENTATION: RIGHT

## 2025-04-07 ASSESSMENT — PAIN SCALES - WONG BAKER: WONGBAKER_NUMERICALRESPONSE: HURTS EVEN MORE

## 2025-04-07 ASSESSMENT — PAIN DESCRIPTION - LOCATION: LOCATION: ANKLE

## 2025-04-07 NOTE — ED PROVIDER NOTES
[facility] EMERGENCY DEPARTMENT  EMERGENCY DEPARTMENT ENCOUNTER      Pt Name: Anthony Santos Jr.  MRN: [id number]  Birthdate 1965  Date of evaluation: 4/6/2025  Provider: Pk Adams MD      HISTORY OF PRESENT ILLNESS      60-year-old -American male with history of hypertension presenting to the ER for generalized fatigue and low back pain.  Patient reports he has had 1 to 2 months of generalized fatigue.  States he has low energy.  Also reports he has had an ache in both flanks for at least a month.  Reports that he has recently been abusing alcohol.  States he is drinking at least a pint of gin daily.  His last drink was this past Thursday.                Nursing Notes were reviewed.    REVIEW OF SYSTEMS         Review of Systems   Constitutional:  Positive for fatigue. Negative for fever.   Respiratory:  Negative for shortness of breath.    Cardiovascular:  Negative for chest pain and leg swelling.   Genitourinary:  Positive for decreased urine volume and flank pain.   Neurological:  Positive for weakness (Generalized).           PAST MEDICAL HISTORY     Past Medical History:   Diagnosis Date    Arthritis     Chronic kidney disease     DVT (deep venous thrombosis) ([facility]) 2023    Left leg---date is approximate    Hyperlipidemia     Hypertension          SURGICAL HISTORY       Past Surgical History:   Procedure Laterality Date    BICEPS TENDON REPAIR Right     COLONOSCOPY N/A 1/23/2025    COLONOSCOPY WITH REMOVAL POLYP SNARE/BIOPSY FORCEPS performed by West Krueger MD at [facility] ENDOSCOPY         CURRENT MEDICATIONS       Previous Medications    ACETAMINOPHEN (TYLENOL) 500 MG TABLET    Take 2 tablets by mouth 3 times daily as needed for Pain    AMLODIPINE (NORVASC) 5 MG TABLET    Take 1 tablet by mouth daily    CLONIDINE (CATAPRES) 0.1 MG TABLET    Take 1 tablet by mouth 2 times daily    TAMSULOSIN (FLOMAX) 0.4 MG CAPSULE    Take 1 capsule by mouth daily       ALLERGIES    details documented in ED Course.  Radiology: ordered.  ECG/medicine tests: ordered.    Risk  Prescription drug management.  Decision regarding hospitalization.            REASSESSMENT     ED Course as of 04/06/25 2142   Sun Apr 06, 2025 2043 BUN,BUNPL(!): 142 [BN]   2043 Creatinine(!): 12.70 [BN]   2111 IMPRESSION:     1. There is suggestion of diffuse peripancreatic inflammation. Valuation is  limited by lack of intravenous contrast. Recommend correlation with serum lipase  for acute pancreatitis.     2. Otherwise no acute intra-abdominal pathology   [BN]   2120 Patient is in acute renal failure.  I discussed case with the on-call nephrologist who recommends giving IV fluids and starting normal saline maintenance rate of 125.  He recommends placing a Beltran catheter for strict I's and O's and urinary output.  There is suggestion of pancreatitis on CT, most likely from alcohol consumption.  I am going to admit the patient to the hospitalist. [BN]      ED Course User Index  [BN] Pk Adams MD         CONSULTS:  None    PROCEDURES:     Procedures    Total critical care time (not including time spent performing separately reportable procedures): 32 minutes    Establishing diagnosis of acute renal failure, initiating treatment and discussing case with nephrology specialist and hospitalist      (Please note that portions of this note were completed with a voice recognition program.  Efforts were made to edit the dictations but occasionally words are mis-transcribed.)    Pk Adams MD (electronically signed)  Emergency Attending Physician              Pk Adams MD  04/11/25 5527

## 2025-04-07 NOTE — PLAN OF CARE
Problem: Discharge Planning  Goal: Discharge to home or other facility with appropriate resources  Outcome: Progressing     Problem: Pain  Goal: Verbalizes/displays adequate comfort level or baseline comfort level  Outcome: Progressing     Problem: Skin/Tissue Integrity  Goal: Skin integrity remains intact  Description: 1.  Monitor for areas of redness and/or skin breakdown  2.  Assess vascular access sites hourly  3.  Every 4-6 hours minimum:  Change oxygen saturation probe site  4.  Every 4-6 hours:  If on nasal continuous positive airway pressure, respiratory therapy assess nares and determine need for appliance change or resting period  Outcome: Progressing  Flowsheets (Taken 4/7/2025 0157 by Nathaly Simpson, RN)  Skin Integrity Remains Intact: Monitor for areas of redness and/or skin breakdown     Problem: ABCDS Injury Assessment  Goal: Absence of physical injury  Outcome: Progressing

## 2025-04-07 NOTE — CONSULTS
NEPHROLOGY CONSULT NOTE     Patient: Anthony Santos Jr. MRN: [id number]  PCP: Jeimy Collins MD   :     1965  Age:   60 y.o.  Sex:  male      Referring physician: Madhavi Jones MD  Reason for consultation:   Admission Date: 2025  6:44 PM  LOS: 1 day        ASSESSMENT and PLAN :   ANDRIY on CKD stage G3: Multifactorial: prerenal from pancreatitis vs LUNDBERG vs rule in GN less likely   -UA + blood and protein  -ACR 21 mg  -tran with excellent UOP: 2L  AGMA  -C02 16 and AG 17  HTN  Hyponatremia   Polysubstance abuse  -UDS+ cocaine and opiates     Plan:  Start on bicarb gtt today  Check serology  Renal usg  Keep tran cath   No indication for RRT  D/w patient              Subjective:   HPI: Anthony Santos Jr. is a 60 y.o.  male with history of CKD stage III last creatinine 1.68 from 2024, hypertension, polysubstance abuse presented to the emergency department with a complaint of abdominal pain nausea vomiting for 2 days.  Patient reported he was drinking alcohol 1 pint gin daily for 1 week.  In the ER workup including CT scan of abdomen and pelvis shows pancreatic inflammation.  Chemistry shows acute kidney injury, creatinine 12.7, , CO2 18, sodium 131, albumin 3.6.  Patient has a urinary retention and Tran catheter was placed.  He has 1.5 L urine output.  Urine analysis shows trace blood and protein.  ACR is 21 mg/g.  Minimal urine protein 32 mg/g .  Urine drug screen positive for cocaine and opiates.  Patient denies taking any NSAIDs    Past Medical Hx:   Past Medical History:   Diagnosis Date    Arthritis     Chronic kidney disease     DVT (deep venous thrombosis) (HCC)     Left leg---date is approximate    Hyperlipidemia     Hypertension         Past Surgical Hx:     Past Surgical History:   Procedure Laterality Date    BICEPS TENDON REPAIR Right     COLONOSCOPY N/A 2025    COLONOSCOPY WITH REMOVAL POLYP SNARE/BIOPSY FORCEPS performed by West Krueger MD

## 2025-04-07 NOTE — H&P
Hospitalist Admission Note    NAME: Anthony Santos Jr.   :  1965   MRN:  [id number]     Date/Time:  2025 9:50 PM    Patient PCP: Jeimy Collins MD    Please note that this dictation was completed with [employer], the computer voice recognition software.  Quite often unanticipated grammatical, syntax, homophones, and other interpretive errors are inadvertently transcribed by the computer software.  Please disregard these errors.  Please excuse any errors that have escaped final proofreading  ________________________________________________________________________    Given the patient's current clinical presentation, I have a high level of concern for decompensation if discharged from the emergency department.  Complex decision making was performed, which includes reviewing the patient's available past medical records, laboratory results, and x-ray films.       My assessment of this patient's clinical condition and my plan of care is as follows.    Assessment / Plan:    ANDRIY on CKD 3, POA  --BUN/Cr 142/12.70, baseline Cr 1.6-2.1 August/Dec 2024  --suspect volume depletion due to n/v, poor po intake  --appreciate nephrology consult.  --tran placed with output 350ml so far    AG metabolic acidosis, POA  Hyponatremia Na 131  --AG 16, serum bicarb 18  --check VBG, lactic acid.  Consider bicarb drip    Pancreatitis due to alcohol, POA  --lipase 571 with suggestion of diffuse peripancreatic inflammation on CT  --npo except sips of clears and ice chips.  IVF, dilaudid prn    Hx substance use disorder  --sniffed heroin last Thursday.  Check UDS.  On suboxone last year    Possible BPH  --no mention of prostatomegaly on CT.  Cont flomax    HTN  --continue amlodipine    Medical Decision Making:   I have personally reviewed the radiographs, laboratory data in Epic and decisions and statements above are based partially on this personal interpretation.    Code Status: Full Code  DVT Prophylaxis: heparin SQ  GI Prophylaxis:  PPI         Subjective:   CHIEF COMPLAINT: \"flank pain, abdominal pain, n/v\"    HISTORY OF PRESENT ILLNESS:     Anthony Santos is a 60 y.o.  male with PMHx CKD 3, HTN, arthritis, substance use disorder, hx left leg DVT 2023 no longer on apixaban presents with b/l flank pain x 3 months, abdominal pain with n/v x 2 days, vomiting 3x/day, decreased appetite.  Dizzy and light headed with standing and feeling like he is going to pass out x 1 month.  No chest pain, SOB, fever, chills, nightsweats.  No leg edema.  +decreased urination.    He has been drinking 1 pint gin daily, stopped last Thursday.  Also sniffed heroin last Thursday.  Denies nsaid use.  Denies bleeding.    In ER, labs notable for Cr 12.7, previously 1.6 12/24 and lipase 571.  CT abdomen without contrast with suggestion of pancreatic inflammation.    Available records were reviewed at the time of H&P.        Past Medical History:   Diagnosis Date    Arthritis     Chronic kidney disease     DVT (deep venous thrombosis) (HCC) 2023    Left leg---date is approximate    Hyperlipidemia     Hypertension       Past Surgical History:   Procedure Laterality Date    BICEPS TENDON REPAIR Right     COLONOSCOPY N/A 1/23/2025    COLONOSCOPY WITH REMOVAL POLYP SNARE/BIOPSY FORCEPS performed by West Krueger MD at [facility] ENDOSCOPY     Social History     Tobacco Use    Smoking status: Never     Passive exposure: Never    Smokeless tobacco: Never   Substance Use Topics    Alcohol use: Yes     Comment: Occasionally      Family History   Problem Relation Age of Onset    Diabetes type 2  Father         No Known Allergies     Prior to Admission medications    Medication Sig Start Date End Date Taking? Authorizing Provider   cloNIDine (CATAPRES) 0.1 MG tablet Take 1 tablet by mouth 2 times daily  Patient not taking: Reported on 3/19/2025    ProviderJose Carlos MD   tamsulosin (FLOMAX) 0.4 MG capsule Take 1 capsule by mouth daily  Patient taking differently: Take 1 capsule by

## 2025-04-07 NOTE — PROGRESS NOTES
[facility]  [address] [place] [address] [phone]    [facility] Adult  Hospitalist Group                                                                                          Hospitalist Progress Note  Madhavi Jones MD        Date of Service:  2025  NAME:  Anthony Santos Jr.  :  1965  MRN:  [id number]      Interval history / Subjective:     No new complaints. Abd pain better. Want to try liquids     Assessment & Plan:     ANDRIY on CKD 3, POA  -BUN/Cr 142/12.70, baseline Cr 1.6-2.1 August/Dec 2024  -no significant improvement overnight  -suspect volume depletion due to n/v, poor po intake and recent cocaine and heroin use  -appreciate nephrology consult.  -tran placed with output 350ml so far     AG metabolic acidosis, POA  Hyponatremia Na 131  -AG 16, serum bicarb 18  -cont bicarb drip     Pancreatitis due to alcohol, POA  -lipase 571 with suggestion of diffuse peripancreatic inflammation on CT  -advance to clear liquids   -cont fluids, IV dilaudid as needed for pain     Hx substance use disorder  -sniffed heroin last Thursday.   -UDS also + for cocaine     Possible BPH  -no mention of prostatomegaly on CT.    -Cont flomax     HTN  -continue amlodipine    Outisde Records, prior notes, labs, radiology, and medications reviewed     Code status: full  DVT prophylaxis: heparin       Hospital Problems           Last Modified POA    * (Principal) ANDRIY (acute kidney injury) 2025 Yes          Review of Systems:   Pertinent items are noted in HPI.       Vital Signs:    Last 24hrs VS reviewed since prior progress note. Most recent are:  Vitals:    25 1122   BP: (!) 125/99   Pulse: 83   Resp: 18   Temp:    SpO2: 99%         Intake/Output Summary (Last 24 hours) at 2025 1138  Last data filed at 2025 0557  Gross per 24 hour   Intake --   Output 1500 ml   Net -1500 ml        Physical Examination:             Constitutional:  No

## 2025-04-07 NOTE — PROGRESS NOTES
Spiritual Health History and Assessment/Progress Note  [facility]    Loneliness/Social Isolation,  ,  ,      Name: Anthony Santos Jr. MRN: [id number]    Age: 60 y.o.     Sex: male   Language: English   Holiness: Other   ANDRIY (acute kidney injury)     Date: 4/7/2025            Total Time Calculated: 26 min              Spiritual Assessment began in [facility] M[facility]SPE[facility]THOPED[facility] 2            Encounter Overview/Reason: Loneliness/Social Isolation  Service Provided For: Patient    Janel, Belief, Meaning:   Patient identifies as spiritual, is connected with a janel tradition or spiritual practice, and has beliefs or practices that help with coping during difficult times  Family/Friends No family/friends present      Importance and Influence:  Patient has spiritual/personal beliefs that influence decisions regarding their health  Family/Friends No family/friends present    Community:  Patient feels well-supported. Support system includes: Children, Janel Community, Friends, and Extended family  Family/Friends No family/friends present    Assessment and Plan of Care:   Reviewed chart prior to bedside encounter. Mr. Santos is quietly lying in bed, listens to Chaplains introduction and role available for him. He welcomed  and shared that he considers himself Rastafarian and has family members of this janel as well. He has a Religious that he considers a home Religious but has not been there in a while. He shared that he has 2 daughters. He is interested in free Bible apps with audio features because he likes to listen to the Bible, he said. He received a phone call on room phone and expressed no needs at this time and thanked  for the care, support and prayers.       Patient Interventions include: Facilitated expression of thoughts and feelings, Explored spiritual coping/struggle/distress, Engaged in theological reflection, Affirmed coping skills/support systems, and Facilitated life

## 2025-04-07 NOTE — CONSULTS
NEPHROLOGY CONSULT NOTE     Patient: Anthony Santos Jr. MRN: [id number]  PCP: Jeimy Collins MD   :     1965  Age:   60 y.o.  Sex:  male      Referring physician: Pk Adams MD  Reason for consultation:   Admission Date: 2025  6:44 PM  LOS: 0 days        ASSESSMENT and PLAN :   ANDRIY stage III -prerenal azotemia versus ATN from decreased perfusion secondary to volume depletion in the setting of pancreatitis + recent cocaine and heroine use  - DDx include hyperlipidemia induced pancreatitis.  Has history of DVT, need to rule out embolic injury.  - Serum creatinine of 12.7 mg/dL and BUN of 142  - Baseline serum creatinine was 1.68 on 2024  - CT and ultrasound are negative for hydronephrosis, renal calculi, cysts, urinary retention  - UA with trace blood and protein, moderate epithelial cells  - Patient given 1 L of NS in ER  - Decreased urine output    Uremia   - BUN of 142  - With fatigue    Anion gap metabolic acidosis  - Serum bicarb of 18  - AG of 16    Hyponatremia, mild  - Sodium of 131    CKD stage IIIa  - Historic EGFR of 47  - Likely from hypertension    Pancreatitis  - Elevated lipase and abdominal discomfort  EtOH abuse  Flank pain  Fatigue  History of DVT    PLAN  Continue volume expansion  Check triglyceride levels  Check renal doppler, hx of DVT and flank pain  Obtain protein quantification and urine lites  Resume home medications of amlodipine  No urgent need for RRT based on current labs  We will continue to follow closely.  Thank you for the consult         Subjective:   HPI: Anthony Santos Jr. is a 60 y.o.  male with past medical history significant for CKD, hypertension, hyperlipidemia, DVT who presented to the ER with complaints of fatigue and flank pain.  In ER, it was found that patient was in acute kidney failure as well as pancreatitis.  CT and ultrasound of the kidneys/bladder did not reveal renal calculi, hydronephrosis, retention.   Patient was seen at  noncontributory.     Objective:    Vitals:    Vitals:    04/06/25 1839   BP: (!) 118/95   Pulse: 97   Resp: 16   Temp: 99.3 °F (37.4 °C)   TempSrc: Oral   SpO2: 100%   Weight: 94.8 kg (209 lb)   Height: 1.803 m (5' 11\")     I&O's:  No intake/output data recorded.  BP (!) 118/95   Pulse 97   Temp 99.3 °F (37.4 °C) (Oral)   Resp 16   Ht 1.803 m (5' 11\")   Wt 94.8 kg (209 lb)   SpO2 100%   BMI 29.15 kg/m²     Physical Exam:    GENERAL : Lying down in bed with no acute distress  HEENT: dry mucosa  NECK: Supple no JVP  CVS: S1 S2 RRR, no murmur or gallops heard  RS: CTABL, no rhonchi,or wheezing heard  ABDOMEN: tender  EXTREMITY: No edema clubbing or cyanosis, pedal pulse +  NEUROLOGY: AAA X3, no focal deficit or asterixis      Laboratory Results:      Lab Results   Component Value Date/Time     04/06/2025 06:52 PM    K 4.5 04/06/2025 06:52 PM    CL 97 04/06/2025 06:52 PM    CO2 18 04/06/2025 06:52 PM     04/06/2025 06:52 PM    CREATININE 12.70 04/06/2025 06:52 PM    GLUCOSE 167 04/06/2025 06:52 PM    GLUCOSE 78 12/16/2024 10:05 AM    CALCIUM 9.6 04/06/2025 06:52 PM          @LASTPROCAMB([id number])@  No results found for: \"LABPROT\", \"LABALBU\"     Lab Results   Component Value Date    WBC 9.1 04/06/2025    HGB 16.0 04/06/2025    HCT 46.7 04/06/2025    MCV 79.8 (L) 04/06/2025     04/06/2025    LYMPHOPCT 12.5 04/06/2025    RBC 5.85 (H) 04/06/2025    MCH 27.4 04/06/2025    MCHC 34.3 04/06/2025    RDW 14.7 (H) 04/06/2025     Lab Results   Component Value Date    CREATININE 12.70 (H) 04/06/2025    CREATININE 1.68 (H) 12/16/2024    CREATININE 2.18 (H) 08/24/2024      Lab Results   Component Value Date     (H) 04/06/2025             Rodger Pope, APRN - NP  4/6/2025    Thank you for consulting [facility] in the care of your patient.

## 2025-04-08 ENCOUNTER — APPOINTMENT (OUTPATIENT)
Facility: HOSPITAL | Age: 60
DRG: 469 | End: 2025-04-08
Payer: MEDICAID

## 2025-04-08 LAB
ANA SER QL: POSITIVE
ANION GAP SERPL CALC-SCNC: 12 MMOL/L (ref 2–12)
BASOPHILS # BLD: 0.02 K/UL (ref 0–0.1)
BASOPHILS NFR BLD: 0.3 % (ref 0–1)
BUN SERPL-MCNC: 129 MG/DL (ref 6–20)
BUN/CREAT SERPL: 13 (ref 12–20)
C3 SERPL-MCNC: 145 MG/DL (ref 82–167)
C4 SERPL-MCNC: 42 MG/DL (ref 12–38)
CALCIUM SERPL-MCNC: 8.3 MG/DL (ref 8.5–10.1)
CENTROMERE B AB SER-ACNC: <0.2 AI (ref 0–0.9)
CHLORIDE SERPL-SCNC: 96 MMOL/L (ref 97–108)
CHROMATIN AB SERPL-ACNC: 0.2 AI (ref 0–0.9)
CO2 SERPL-SCNC: 26 MMOL/L (ref 21–32)
CREAT SERPL-MCNC: 10 MG/DL (ref 0.7–1.3)
DIFFERENTIAL METHOD BLD: ABNORMAL
DSDNA AB SER-ACNC: <1 IU/ML (ref 0–9)
ECHO BSA: 2.18 M2
ENA JO1 AB SER-ACNC: <0.2 AI (ref 0–0.9)
ENA RNP AB SER-ACNC: 0.3 AI (ref 0–0.9)
ENA SCL70 AB SER-ACNC: <0.2 AI (ref 0–0.9)
ENA SM AB SER-ACNC: <0.2 AI (ref 0–0.9)
ENA SS-A AB SER-ACNC: >8 AI (ref 0–0.9)
ENA SS-B AB SER-ACNC: <0.2 AI (ref 0–0.9)
EOSINOPHIL # BLD: 0.03 K/UL (ref 0–0.4)
EOSINOPHIL NFR BLD: 0.4 % (ref 0–7)
ERYTHROCYTE [DISTWIDTH] IN BLOOD BY AUTOMATED COUNT: 13.9 % (ref 11.5–14.5)
GLUCOSE BLD STRIP.AUTO-MCNC: 106 MG/DL (ref 65–117)
GLUCOSE BLD STRIP.AUTO-MCNC: 118 MG/DL (ref 65–117)
GLUCOSE BLD STRIP.AUTO-MCNC: 119 MG/DL (ref 65–117)
GLUCOSE BLD STRIP.AUTO-MCNC: 124 MG/DL (ref 65–117)
GLUCOSE BLD STRIP.AUTO-MCNC: 163 MG/DL (ref 65–117)
GLUCOSE SERPL-MCNC: 115 MG/DL (ref 65–100)
HCT VFR BLD AUTO: 39.2 % (ref 36.6–50.3)
HGB BLD-MCNC: 13.4 G/DL (ref 12.1–17)
IMM GRANULOCYTES # BLD AUTO: 0.03 K/UL (ref 0–0.04)
IMM GRANULOCYTES NFR BLD AUTO: 0.4 % (ref 0–0.5)
LIPASE SERPL-CCNC: 600 U/L (ref 13–75)
LYMPHOCYTES # BLD: 1.57 K/UL (ref 0.8–3.5)
LYMPHOCYTES NFR BLD: 22.2 % (ref 12–49)
Lab: ABNORMAL
MCH RBC QN AUTO: 27.8 PG (ref 26–34)
MCHC RBC AUTO-ENTMCNC: 34.2 G/DL (ref 30–36.5)
MCV RBC AUTO: 81.3 FL (ref 80–99)
MONOCYTES # BLD: 0.98 K/UL (ref 0–1)
MONOCYTES NFR BLD: 13.9 % (ref 5–13)
NEUTS SEG # BLD: 4.43 K/UL (ref 1.8–8)
NEUTS SEG NFR BLD: 62.8 % (ref 32–75)
NRBC # BLD: 0 K/UL (ref 0–0.01)
NRBC BLD-RTO: 0 PER 100 WBC
PLATELET # BLD AUTO: 164 K/UL (ref 150–400)
PMV BLD AUTO: 9.5 FL (ref 8.9–12.9)
POTASSIUM SERPL-SCNC: 4.1 MMOL/L (ref 3.5–5.1)
RBC # BLD AUTO: 4.82 M/UL (ref 4.1–5.7)
SERVICE CMNT-IMP: ABNORMAL
SERVICE CMNT-IMP: NORMAL
SODIUM SERPL-SCNC: 134 MMOL/L (ref 136–145)
VAS AORTA DIST AP: 2.14 CM
VAS AORTA DIST PSV: 51.8 CM/S
VAS AORTA MID AP: 2.61 CM
VAS AORTA MID PSV: 62.9 CM/S
VAS AORTA PROX AP: 2.75 CM
VAS AORTA PROX PSV: 61.7 CM/S
VAS CELIAC EDV: 55.6 CM/S
VAS CELIAC PSV: 184.9 CM/S
VAS LEFT KIDNEY LENGTH: 10.83 CM
VAS LEFT KIDNEY WIDTH: 5.48 CM
VAS LEFT RENAL DIST EDV: 26.8 CM/S
VAS LEFT RENAL DIST PSV: 48.7 CM/S
VAS LEFT RENAL DIST RAR: 0.77
VAS LEFT RENAL DIST RI: 0.45
VAS LEFT RENAL LOWER PARENCHYMA EDV: 26.8 CM/S
VAS LEFT RENAL LOWER PARENCHYMA PSV: 48.7 CM/S
VAS LEFT RENAL LOWER PARENCHYMA RI: 0.45
VAS LEFT RENAL MID EDV: 33.4 CM/S
VAS LEFT RENAL MID PSV: 55.3 CM/S
VAS LEFT RENAL MID RAR: 0.88
VAS LEFT RENAL MID RI: 0.4
VAS LEFT RENAL MIDDLE PARENCHYMA EDV: 29 CM/S
VAS LEFT RENAL MIDDLE PARENCHYMA PSV: 50.9 CM/S
VAS LEFT RENAL MIDDLE PARENCHYMA RI: 0.43
VAS LEFT RENAL PROX EDV: 35.5 CM/S
VAS LEFT RENAL PROX PSV: 68.5 CM/S
VAS LEFT RENAL PROX RAR: 1.09
VAS LEFT RENAL PROX RI: 0.48
VAS LEFT RENAL RAR: 1.09
VAS LEFT RENAL UPPER PARENCHYMA EDV: 37.7 CM/S
VAS LEFT RENAL UPPER PARENCHYMA PSV: 64.1 CM/S
VAS LEFT RENAL UPPER PARENCHYMA RI: 0.41
VAS PROX SMA EDV: 39.4 CM/S
VAS PROX SMA PSV: 168.8 CM/S
VAS RIGHT KIDNEY LENGTH: 9.53 CM
VAS RIGHT KIDNEY WIDTH: 4.9 CM
VAS RIGHT RENAL DIST EDV: 24.6 CM/S
VAS RIGHT RENAL DIST PSV: 48.7 CM/S
VAS RIGHT RENAL DIST RAR: 0.77
VAS RIGHT RENAL DIST RI: 0.49
VAS RIGHT RENAL LOWER PARENCHYMA EDV: 19.9 CM/S
VAS RIGHT RENAL LOWER PARENCHYMA PSV: 40.8 CM/S
VAS RIGHT RENAL LOWER PARENCHYMA RI: 0.51
VAS RIGHT RENAL MID EDV: 35.6 CM/S
VAS RIGHT RENAL MID PSV: 72.9 CM/S
VAS RIGHT RENAL MID RAR: 1.16
VAS RIGHT RENAL MID RI: 0.51
VAS RIGHT RENAL MIDDLE PARENCHYMA EDV: 18.7 CM/S
VAS RIGHT RENAL MIDDLE PARENCHYMA PSV: 46.9 CM/S
VAS RIGHT RENAL MIDDLE PARENCHYMA RI: 0.6
VAS RIGHT RENAL PROX EDV: 35.6 CM/S
VAS RIGHT RENAL PROX PSV: 72.9 CM/S
VAS RIGHT RENAL PROX RAR: 1.16
VAS RIGHT RENAL PROX RI: 0.51
VAS RIGHT RENAL RAR: 1.16
VAS RIGHT RENAL UPPER PARENCHYMA EDV: 13.8 CM/S
VAS RIGHT RENAL UPPER PARENCHYMA PSV: 33.4 CM/S
VAS RIGHT RENAL UPPER PARENCHYMA RI: 0.59
WBC # BLD AUTO: 7.1 K/UL (ref 4.1–11.1)

## 2025-04-08 PROCEDURE — 80048 BASIC METABOLIC PNL TOTAL CA: CPT

## 2025-04-08 PROCEDURE — 1100000000 HC RM PRIVATE

## 2025-04-08 PROCEDURE — 6360000002 HC RX W HCPCS: Performed by: HOSPITALIST

## 2025-04-08 PROCEDURE — 85025 COMPLETE CBC W/AUTO DIFF WBC: CPT

## 2025-04-08 PROCEDURE — 73600 X-RAY EXAM OF ANKLE: CPT

## 2025-04-08 PROCEDURE — 2500000003 HC RX 250 WO HCPCS: Performed by: INTERNAL MEDICINE

## 2025-04-08 PROCEDURE — 2500000003 HC RX 250 WO HCPCS: Performed by: HOSPITALIST

## 2025-04-08 PROCEDURE — 82962 GLUCOSE BLOOD TEST: CPT

## 2025-04-08 PROCEDURE — 2580000003 HC RX 258: Performed by: INTERNAL MEDICINE

## 2025-04-08 PROCEDURE — 83690 ASSAY OF LIPASE: CPT

## 2025-04-08 PROCEDURE — 6370000000 HC RX 637 (ALT 250 FOR IP): Performed by: HOSPITALIST

## 2025-04-08 PROCEDURE — 2580000003 HC RX 258: Performed by: HOSPITALIST

## 2025-04-08 PROCEDURE — 94761 N-INVAS EAR/PLS OXIMETRY MLT: CPT

## 2025-04-08 RX ORDER — SODIUM CHLORIDE, SODIUM LACTATE, POTASSIUM CHLORIDE, CALCIUM CHLORIDE 600; 310; 30; 20 MG/100ML; MG/100ML; MG/100ML; MG/100ML
INJECTION, SOLUTION INTRAVENOUS CONTINUOUS
Status: DISCONTINUED | OUTPATIENT
Start: 2025-04-08 | End: 2025-04-10 | Stop reason: HOSPADM

## 2025-04-08 RX ADMIN — SODIUM CHLORIDE, PRESERVATIVE FREE 10 ML: 5 INJECTION INTRAVENOUS at 21:32

## 2025-04-08 RX ADMIN — SODIUM BICARBONATE: 84 INJECTION, SOLUTION INTRAVENOUS at 07:49

## 2025-04-08 RX ADMIN — ACETAMINOPHEN 650 MG: 325 TABLET ORAL at 09:29

## 2025-04-08 RX ADMIN — HEPARIN SODIUM 5000 UNITS: 5000 INJECTION INTRAVENOUS; SUBCUTANEOUS at 21:31

## 2025-04-08 RX ADMIN — AMLODIPINE BESYLATE 5 MG: 5 TABLET ORAL at 09:29

## 2025-04-08 RX ADMIN — SODIUM CHLORIDE, SODIUM LACTATE, POTASSIUM CHLORIDE, AND CALCIUM CHLORIDE: .6; .31; .03; .02 INJECTION, SOLUTION INTRAVENOUS at 21:33

## 2025-04-08 RX ADMIN — ACETAMINOPHEN 650 MG: 325 TABLET ORAL at 15:44

## 2025-04-08 RX ADMIN — TAMSULOSIN HYDROCHLORIDE 0.4 MG: 0.4 CAPSULE ORAL at 09:29

## 2025-04-08 RX ADMIN — HEPARIN SODIUM 5000 UNITS: 5000 INJECTION INTRAVENOUS; SUBCUTANEOUS at 06:29

## 2025-04-08 RX ADMIN — SODIUM CHLORIDE 40 MG: 9 INJECTION INTRAMUSCULAR; INTRAVENOUS; SUBCUTANEOUS at 09:29

## 2025-04-08 RX ADMIN — SODIUM CHLORIDE, SODIUM LACTATE, POTASSIUM CHLORIDE, AND CALCIUM CHLORIDE: .6; .31; .03; .02 INJECTION, SOLUTION INTRAVENOUS at 09:29

## 2025-04-08 RX ADMIN — HEPARIN SODIUM 5000 UNITS: 5000 INJECTION INTRAVENOUS; SUBCUTANEOUS at 13:18

## 2025-04-08 ASSESSMENT — PAIN DESCRIPTION - DESCRIPTORS
DESCRIPTORS: ACHING
DESCRIPTORS: ACHING

## 2025-04-08 ASSESSMENT — PAIN SCALES - GENERAL
PAINLEVEL_OUTOF10: 5
PAINLEVEL_OUTOF10: 0
PAINLEVEL_OUTOF10: 5
PAINLEVEL_OUTOF10: 0
PAINLEVEL_OUTOF10: 5

## 2025-04-08 ASSESSMENT — PAIN DESCRIPTION - ORIENTATION
ORIENTATION: LEFT
ORIENTATION: LEFT

## 2025-04-08 ASSESSMENT — PAIN DESCRIPTION - LOCATION
LOCATION: ANKLE
LOCATION: ANKLE

## 2025-04-08 ASSESSMENT — PAIN - FUNCTIONAL ASSESSMENT
PAIN_FUNCTIONAL_ASSESSMENT: ACTIVITIES ARE NOT PREVENTED
PAIN_FUNCTIONAL_ASSESSMENT: ACTIVITIES ARE NOT PREVENTED

## 2025-04-08 NOTE — PLAN OF CARE
Problem: Discharge Planning  Goal: Discharge to home or other facility with appropriate resources  4/7/2025 2216 by Peter Amaya RN  Outcome: Progressing  4/7/2025 2032 by Marck Mcdermott RN  Outcome: Progressing  Flowsheets (Taken 4/7/2025 1942 by Nathaly Simpson RN)  Discharge to home or other facility with appropriate resources: Identify barriers to discharge with patient and caregiver     Problem: Pain  Goal: Verbalizes/displays adequate comfort level or baseline comfort level  4/7/2025 2216 by Peter Amaya RN  Outcome: Progressing  4/7/2025 2032 by Marck Mcdermott RN  Outcome: Progressing     Problem: Skin/Tissue Integrity  Goal: Skin integrity remains intact  Description: 1.  Monitor for areas of redness and/or skin breakdown  2.  Assess vascular access sites hourly  3.  Every 4-6 hours minimum:  Change oxygen saturation probe site  4.  Every 4-6 hours:  If on nasal continuous positive airway pressure, respiratory therapy assess nares and determine need for appliance change or resting period  4/7/2025 2216 by Peter Amaya RN  Outcome: Progressing  4/7/2025 2032 by Marck Mcdermott RN  Outcome: Progressing  Flowsheets (Taken 4/7/2025 1532)  Skin Integrity Remains Intact: Monitor skin under medical devices     Problem: ABCDS Injury Assessment  Goal: Absence of physical injury  4/7/2025 2216 by Peter Amaya RN  Outcome: Progressing  4/7/2025 2032 by Marck Mcdermott RN  Outcome: Progressing     Problem: Safety - Adult  Goal: Free from fall injury  4/7/2025 2216 by Peter Amaya RN  Outcome: Progressing  4/7/2025 2032 by Marck Mcdermott RN  Outcome: Progressing

## 2025-04-08 NOTE — PLAN OF CARE
Problem: Discharge Planning  Goal: Discharge to home or other facility with appropriate resources  Outcome: Progressing  Flowsheets (Taken 4/7/2025 1942 by Nathaly Simpson, RN)  Discharge to home or other facility with appropriate resources: Identify barriers to discharge with patient and caregiver     Problem: Pain  Goal: Verbalizes/displays adequate comfort level or baseline comfort level  Outcome: Progressing     Problem: Skin/Tissue Integrity  Goal: Skin integrity remains intact  Description: 1.  Monitor for areas of redness and/or skin breakdown  2.  Assess vascular access sites hourly  3.  Every 4-6 hours minimum:  Change oxygen saturation probe site  4.  Every 4-6 hours:  If on nasal continuous positive airway pressure, respiratory therapy assess nares and determine need for appliance change or resting period  Outcome: Progressing  Flowsheets (Taken 4/7/2025 1532)  Skin Integrity Remains Intact: Monitor skin under medical devices     Problem: ABCDS Injury Assessment  Goal: Absence of physical injury  Outcome: Progressing     Problem: Safety - Adult  Goal: Free from fall injury  Outcome: Progressing

## 2025-04-08 NOTE — PROGRESS NOTES
[facility]  [address] [place] [address] [phone]    Formerly [facility] Adult  Hospitalist Group                                                                                          Hospitalist Progress Note  Madhavi Jones MD        Date of Service:  2025  NAME:  Anthony Santos Jr.  :  1965  MRN:  [id number]      Interval history / Subjective:     No new complaints. Abd pain better. Tolerating clear liquids      Assessment & Plan:     ANDRIY on CKD 3, POA  -BUN/Cr 142/12.70, baseline Cr 1.6-2.1 August/Dec 2024  -slowly improving  -suspect volume depletion due to n/v, poor po intake and recent cocaine and heroin use  -appreciate nephrology recommendations  -tran placed with output 350ml so far     AG metabolic acidosis, POA  Hyponatremia Na 131  -AG 16, serum bicarb 18  -cont fluids     Pancreatitis due to alcohol, POA  -lipase 571 with suggestion of diffuse peripancreatic inflammation on CT  -advance to full liquids   -cont fluids, IV dilaudid as needed for pain, taper dose      Hx substance use disorder  -sniffed heroin last Thursday.   -UDS also + for cocaine     Possible BPH  -no mention of prostatomegaly on CT.    -Cont flomax     HTN  -continue amlodipine    Outisde Records, prior notes, labs, radiology, and medications reviewed     Code status: full  DVT prophylaxis: heparin       Hospital Problems           Last Modified POA    * (Principal) ANDRIY (acute kidney injury) 2025 Yes          Review of Systems:   Pertinent items are noted in HPI.       Vital Signs:    Last 24hrs VS reviewed since prior progress note. Most recent are:  Vitals:    25 1127   BP: (!) 127/98   Pulse: 74   Resp: 18   Temp: 97.9 °F (36.6 °C)   SpO2: 98%         Intake/Output Summary (Last 24 hours) at 2025 1228  Last data filed at 2025 1004  Gross per 24 hour   Intake 400 ml   Output 2175 ml   Net -1775 ml        Physical Examination:              Constitutional:  No acute distress, cooperative, pleasant    ENT:  Oral mucosa moist, oropharynx benign.    Resp:  CTA bilaterally. No wheezing/rhonchi/rales. No accessory muscle use   CV:  Regular rhythm, normal rate, no murmurs, gallops, rubs    GI:  Soft, non distended, non tender. normoactive bowel sounds, no hepatosplenomegaly     Musculoskeletal:  No edema, warm, 2+ pulses throughout    Neurologic:  Moves all extremities.  AAOx3, CN II-XII reviewed     Psych:  Good insight, Not anxious nor agitated.       Data Review:    Review and/or order of clinical lab test      Labs:     Recent Labs     04/07/25 0318 04/08/25  0342   WBC 9.6 7.1   HGB 15.9 13.4   HCT 47.3 39.2    164     Recent Labs     04/06/25  1852 04/07/25  0318 04/07/25  0908 04/08/25  0342   * 134*  --  134*   K 4.5 4.8  --  4.1   CL 97 101  --  96*   CO2 18* 16*  --  26   * 140*  --  129*   PHOS  --   --  6.6*  --      Lab Results   Component Value Date/Time     04/07/2025 03:18 AM     04/06/2025 06:52 PM    ALT 13 12/16/2024 10:05 AM    GLOB 4.2 04/07/2025 03:18 AM    GLOB 5.2 04/06/2025 06:52 PM    GLOB 4.4 08/24/2024 08:55 AM     No results found for: \"INR\", \"APTT\"   No results found for: \"IRON\", \"TIBC\"   No results found for: \"RBCF\"   No results for input(s): \"PH\", \"PCO2\", \"PO2\" in the last 72 hours.  No results found for: \"CPK\"  No results found for: \"CHOL\", \"CHLST\", \"CHOLV\", \"HDL\", \"HDLC\", \"LDL\", \"LDLC\"  No results found for: \"GLUCPOC\"  No results found for: \"UA\"      Medications Reviewed:     Current Facility-Administered Medications   Medication Dose Route Frequency    lactated ringers infusion   IntraVENous Continuous    HYDROmorphone (DILAUDID) injection 0.5 mg  0.5 mg IntraVENous Q4H PRN    amLODIPine (NORVASC) tablet 5 mg  5 mg Oral Daily    tamsulosin (FLOMAX) capsule 0.4 mg  0.4 mg Oral Daily    insulin lispro (HUMALOG,ADMELOG) injection vial 0-4 Units  0-4 Units SubCUTAneous Q6H

## 2025-04-08 NOTE — PROGRESS NOTES
NEPHROLOGY CONSULT NOTE     Patient: Anthony Santos Jr. MRN: [id number]  PCP: Jeimy Collins MD   :     1965  Age:   60 y.o.  Sex:  male      Referring physician: Madhavi Jones MD  Reason for consultation:   Admission Date: 2025  6:44 PM  LOS: 2 days        ASSESSMENT and PLAN :   ANDRIY on CKD stage G3: Multifactorial: prerenal from pancreatitis vs LUNDBERG vs rule in GN less likely   -- improving slowly  -Cr 10.0 from 12.7  AGMA  -better with bicarb gtt  HTN  Hyponatremia   Polysubstance abuse  -UDS+ cocaine and opiates     Plan:  Change to LR  Serology pending  Renal usg c/w MRD  Okay to remove tran cath   No indication for RRT  D/w patient              Subjective:   HPI: ANDRIY cr 10.0  Good UOP        Past Medical Hx:   Past Medical History:   Diagnosis Date    Arthritis     Chronic kidney disease     DVT (deep venous thrombosis) (HCC)     Left leg---date is approximate    Hyperlipidemia     Hypertension         Past Surgical Hx:     Past Surgical History:   Procedure Laterality Date    BICEPS TENDON REPAIR Right     COLONOSCOPY N/A 2025    COLONOSCOPY WITH REMOVAL POLYP SNARE/BIOPSY FORCEPS performed by West Krueger MD at [facility] ENDOSCOPY       Medications:  Prior to Admission medications    Medication Sig Start Date End Date Taking? Authorizing Provider   amLODIPine (NORVASC) 5 MG tablet Take 1 tablet by mouth daily 24  Yes Jeimy Collins MD   cloNIDine (CATAPRES) 0.1 MG tablet Take 1 tablet by mouth 2 times daily  Patient not taking: Reported on 3/19/2025    Provider, MD Jose Carlos   tamsulosin (FLOMAX) 0.4 MG capsule Take 1 capsule by mouth daily  Patient taking differently: Take 1 capsule by mouth daily Taking as needed per patient 24   Jeimy Collins MD   acetaminophen (TYLENOL) 500 MG tablet Take 2 tablets by mouth 3 times daily as needed for Pain  Patient not taking: Reported on 3/19/2025 4/25/24   Jeimy Collins MD       No Known Allergies    Social Hx:  reports that he has    Component Value Date    WBC 7.1 04/08/2025    HGB 13.4 04/08/2025    HCT 39.2 04/08/2025    MCV 81.3 04/08/2025     04/08/2025    LYMPHOPCT 22.2 04/08/2025    RBC 4.82 04/08/2025    MCH 27.8 04/08/2025    MCHC 34.2 04/08/2025    RDW 13.9 04/08/2025                   Anne Khalil MD  4/8/2025    Thank you for consulting [facility] in the care of your patient.    [facility]:  [email or web address]  [email or web address]    [place] Office:  [id number] [place] [address]  Phone: [phone]  Fax :     [phone]    [place] office:  [address] [place] [address]  Phone - [phone]  Fax - [phone]

## 2025-04-09 LAB
ALBUMIN SERPL ELPH-MCNC: 3.1 G/DL (ref 2.9–4.4)
ALBUMIN/GLOB SERPL: 0.8 (ref 0.7–1.7)
ALPHA1 GLOB SERPL ELPH-MCNC: 0.4 G/DL (ref 0–0.4)
ALPHA2 GLOB SERPL ELPH-MCNC: 0.9 G/DL (ref 0.4–1)
ANION GAP SERPL CALC-SCNC: 8 MMOL/L (ref 2–12)
B-GLOBULIN SERPL ELPH-MCNC: 1.3 G/DL (ref 0.7–1.3)
BUN SERPL-MCNC: 103 MG/DL (ref 6–20)
BUN/CREAT SERPL: 13 (ref 12–20)
CALCIUM SERPL-MCNC: 8.7 MG/DL (ref 8.5–10.1)
CHLORIDE SERPL-SCNC: 101 MMOL/L (ref 97–108)
CO2 SERPL-SCNC: 27 MMOL/L (ref 21–32)
CREAT SERPL-MCNC: 8.1 MG/DL (ref 0.7–1.3)
GAMMA GLOB SERPL ELPH-MCNC: 1.6 G/DL (ref 0.4–1.8)
GLOBULIN SER-MCNC: 4.1 G/DL (ref 2.2–3.9)
GLUCOSE BLD STRIP.AUTO-MCNC: 106 MG/DL (ref 65–117)
GLUCOSE BLD STRIP.AUTO-MCNC: 108 MG/DL (ref 65–117)
GLUCOSE BLD STRIP.AUTO-MCNC: 109 MG/DL (ref 65–117)
GLUCOSE BLD STRIP.AUTO-MCNC: 154 MG/DL (ref 65–117)
GLUCOSE SERPL-MCNC: 102 MG/DL (ref 65–100)
IGA SERPL-MCNC: 412 MG/DL (ref 90–386)
IGG SERPL-MCNC: 1735 MG/DL (ref 603–1613)
IGM SERPL-MCNC: 32 MG/DL (ref 20–172)
INTERPRETATION SERPL IEP-IMP: ABNORMAL
KAPPA LC FREE SER-MCNC: 96.9 MG/L (ref 3.3–19.4)
KAPPA LC FREE/LAMBDA FREE SER: 1.97 (ref 0.26–1.65)
LAMBDA LC FREE SERPL-MCNC: 49.2 MG/L (ref 5.7–26.3)
M PROTEIN SERPL ELPH-MCNC: ABNORMAL G/DL
PHOSPHOLIPASE A2 RECEPTOR, IGG: <1.8 RU/ML (ref 0–19.9)
POTASSIUM SERPL-SCNC: 4.4 MMOL/L (ref 3.5–5.1)
PROT SERPL-MCNC: 7.2 G/DL (ref 6–8.5)
SERVICE CMNT-IMP: ABNORMAL
SERVICE CMNT-IMP: NORMAL
SODIUM SERPL-SCNC: 136 MMOL/L (ref 136–145)

## 2025-04-09 PROCEDURE — 97116 GAIT TRAINING THERAPY: CPT

## 2025-04-09 PROCEDURE — 6360000002 HC RX W HCPCS: Performed by: HOSPITALIST

## 2025-04-09 PROCEDURE — 97161 PT EVAL LOW COMPLEX 20 MIN: CPT

## 2025-04-09 PROCEDURE — 82962 GLUCOSE BLOOD TEST: CPT

## 2025-04-09 PROCEDURE — 6370000000 HC RX 637 (ALT 250 FOR IP): Performed by: STUDENT IN AN ORGANIZED HEALTH CARE EDUCATION/TRAINING PROGRAM

## 2025-04-09 PROCEDURE — 2580000003 HC RX 258: Performed by: INTERNAL MEDICINE

## 2025-04-09 PROCEDURE — 2500000003 HC RX 250 WO HCPCS: Performed by: HOSPITALIST

## 2025-04-09 PROCEDURE — 1100000000 HC RM PRIVATE

## 2025-04-09 PROCEDURE — 6370000000 HC RX 637 (ALT 250 FOR IP): Performed by: HOSPITALIST

## 2025-04-09 PROCEDURE — 6370000000 HC RX 637 (ALT 250 FOR IP): Performed by: INTERNAL MEDICINE

## 2025-04-09 PROCEDURE — 94761 N-INVAS EAR/PLS OXIMETRY MLT: CPT

## 2025-04-09 PROCEDURE — 80048 BASIC METABOLIC PNL TOTAL CA: CPT

## 2025-04-09 RX ORDER — OXYCODONE HYDROCHLORIDE 5 MG/1
5 TABLET ORAL EVERY 6 HOURS PRN
Refills: 0 | Status: DISCONTINUED | OUTPATIENT
Start: 2025-04-09 | End: 2025-04-10 | Stop reason: HOSPADM

## 2025-04-09 RX ORDER — AMLODIPINE BESYLATE 5 MG/1
10 TABLET ORAL DAILY
Status: DISCONTINUED | OUTPATIENT
Start: 2025-04-09 | End: 2025-04-10 | Stop reason: HOSPADM

## 2025-04-09 RX ADMIN — OXYCODONE HYDROCHLORIDE 5 MG: 5 TABLET ORAL at 10:48

## 2025-04-09 RX ADMIN — ACETAMINOPHEN 650 MG: 325 TABLET ORAL at 15:02

## 2025-04-09 RX ADMIN — HEPARIN SODIUM 5000 UNITS: 5000 INJECTION INTRAVENOUS; SUBCUTANEOUS at 06:50

## 2025-04-09 RX ADMIN — SODIUM CHLORIDE, PRESERVATIVE FREE 10 ML: 5 INJECTION INTRAVENOUS at 22:17

## 2025-04-09 RX ADMIN — AMLODIPINE BESYLATE 10 MG: 5 TABLET ORAL at 09:01

## 2025-04-09 RX ADMIN — HEPARIN SODIUM 5000 UNITS: 5000 INJECTION INTRAVENOUS; SUBCUTANEOUS at 22:17

## 2025-04-09 RX ADMIN — SODIUM CHLORIDE, SODIUM LACTATE, POTASSIUM CHLORIDE, AND CALCIUM CHLORIDE: .6; .31; .03; .02 INJECTION, SOLUTION INTRAVENOUS at 06:51

## 2025-04-09 RX ADMIN — ACETAMINOPHEN 650 MG: 325 TABLET ORAL at 00:07

## 2025-04-09 RX ADMIN — ACETAMINOPHEN 650 MG: 325 TABLET ORAL at 22:27

## 2025-04-09 RX ADMIN — TAMSULOSIN HYDROCHLORIDE 0.4 MG: 0.4 CAPSULE ORAL at 09:01

## 2025-04-09 RX ADMIN — HEPARIN SODIUM 5000 UNITS: 5000 INJECTION INTRAVENOUS; SUBCUTANEOUS at 15:02

## 2025-04-09 ASSESSMENT — PAIN DESCRIPTION - ORIENTATION: ORIENTATION: LEFT

## 2025-04-09 ASSESSMENT — PAIN DESCRIPTION - LOCATION: LOCATION: ANKLE

## 2025-04-09 ASSESSMENT — PAIN SCALES - GENERAL
PAINLEVEL_OUTOF10: 3
PAINLEVEL_OUTOF10: 10

## 2025-04-09 ASSESSMENT — PAIN SCALES - WONG BAKER: WONGBAKER_NUMERICALRESPONSE: HURTS EVEN MORE

## 2025-04-09 ASSESSMENT — PAIN DESCRIPTION - DESCRIPTORS: DESCRIPTORS: ACHING

## 2025-04-09 NOTE — PLAN OF CARE
Problem: Physical Therapy - Adult  Goal: By Discharge: Performs mobility at highest level of function for planned discharge setting.  See evaluation for individualized goals.  Description: FUNCTIONAL STATUS PRIOR TO ADMISSION: Patient was independent and active without use of DME.    HOME SUPPORT PRIOR TO ADMISSION: The patient lived alone with no local support.    Physical Therapy Goals  Initiated 4/9/2025  1.  Patient will move from supine to sit and sit to supine, scoot up and down, and roll side to side in bed with independence within 7 day(s).    2.  Patient will perform sit to stand with independence within 7 day(s).  3.  Patient will transfer from bed to chair and chair to bed with independence using the least restrictive device within 7 day(s).  4.  Patient will ambulate with independence for 200 feet with the least restrictive device within 7 day(s).     Outcome: Progressing   PHYSICAL THERAPY EVALUATION    Patient: Anthony Santos Jr. (60 y.o. male)  Date: 4/9/2025  Primary Diagnosis: ANDRIY (acute kidney injury) [N17.9]       Precautions:              ASSESSMENT :   DEFICITS/IMPAIRMENTS:   The patient is limited by decreased functional mobility, independence in ADLs, high-level IADLs, strength, activity tolerance, endurance, safety awareness, coordination, balance, posture, increased pain levels     Based on the impairments listed above patient presents with difficulty with ambulation. Communicated with nurse cleared for therapy. Patient long sitting on the bed when received. Mobilized patient today with rolling walker SBA. Sit on the edge of bed, ambulate with rolling walker SBA in the room . Patient reporting pain on the left foot when putting weights on it. Able to tolerate NWB while using the rolling walker. Offered to ambulate out on the 4th floor hallway patient declined just want to ambulate inside the room and sit back on the bed. Communicated with nurse who agreed to monitor patient.    Patient                                                                                                                                                                             Pain Ratin/10   Pain Intervention(s):   nursing notified and addressing    Activity Tolerance:   Good    After treatment:   Patient left in no apparent distress in bed, Call bell within reach, Bed/ chair alarm activated, and Heels elevated for pressure relief    COMMUNICATION/EDUCATION:   The patient's plan of care was discussed with: registered nurse    Patient Education  Education Given To: Patient  Education Provided: Role of Therapy;Transfer Training;Plan of Care;Mobility Training;Equipment;Home Exercise Program;Energy Conservation;Precautions;IADL Safety;Fall Prevention Strategies;Orientation;ADL Adaptive Strategies  Education Method: Verbal  Barriers to Learning: None  Education Outcome: Verbalized understanding;Continued education needed    Thank you for this referral.  GINA YOUNG, PT,C.  Minutes: 28      Physical Therapy Evaluation Charge Determination   History Examination Presentation Decision-Making   LOW Complexity : Zero comorbidities / personal factors that will impact the outcome / POC LOW Complexity : 1-2 Standardized tests and measures addressing body structure, function, activity limitation and / or participation in recreation  LOW Complexity : Stable, uncomplicated  AM-PAC  LOW    Based on the above components, the patient evaluation is determined to be of the following complexity level: Low

## 2025-04-09 NOTE — CARE COORDINATION
04/09/25  4:36 PM  RW order received and sent to [employer] in [employer]. Pending acceptance and bedside delivery.              Care Management Initial Assessment  4/9/2025 1:02 PM  If patient is discharged prior to next notation, then this note serves as note for discharge by case management.      Reason for Admission:   ANDRIY (acute kidney injury) [N17.9]           Patient Admission Status: Inpatient  Date Admitted to INP: 4/6/25  RUR: Readmission Risk Score: 12.4      Hospitalization in the last 30 days (Readmission):  No        Advance Care Planning:  Code Status: Full Code  Primary Healthcare Decision Maker: (P) Legal Next of Kin   Advance Directive: legal NOK     __________________________________________________________________________  Assessment:        04/09/25 1300   Service Assessment   Patient Orientation Alert and Oriented   Cognition Alert   History Provided By Patient   Primary Caregiver Self   Support Systems Parent   Patient's Healthcare Decision Maker is: Legal Next of Kin   PCP Verified by CM Yes  (Dr. Collins)   Last Visit to PCP Within last 6 months   Prior Functional Level Independent in ADLs/IADLs   Can patient return to prior living arrangement Yes   Ability to make needs known: Good   Family able to assist with home care needs: Yes   Would you like for me to discuss the discharge plan with any other family members/significant others, and if so, who? Yes  (mother Wendy [phone])   Financial Resources Medicaid   Community Resources None   Social/Functional History   Lives With Parent   Type of Home Apartment  (on the 3rd floor)   Home Layout One level   Home Access Elevator   Bathroom Equipment Built-in shower seat;Grab bars in shower;Grab bars around toilet   Bathroom Accessibility Wheelchair accessible   Home Equipment None   Prior Level of Assist for ADLs Independent   Prior Level of Assist for Homemaking Independent   Ambulation Assistance Independent   Prior Level of Assist for Transfers

## 2025-04-09 NOTE — PROGRESS NOTES
NEPHROLOGY CONSULT NOTE     Patient: Anthony Santos Jr. MRN: [id number]  PCP: Jeimy Collins MD   :     1965  Age:   60 y.o.  Sex:  male      Referring physician: West Mccullough MD  Reason for consultation:   Admission Date: 2025  6:44 PM  LOS: 3 days        ASSESSMENT and PLAN :   ANDRIY on CKD stage G3: Multifactorial: prerenal from pancreatitis vs LUNDBERG vs rule in GN less likely   -- improving slowly  -Cr 8.0  AGMA  -better   HTN  Hyponatremia   Polysubstance abuse  -UDS+ cocaine and opiates     Plan:  continue LR  SPEP + polyclonal increase detected one or more immunoglongulins, elevated C4, ROSALIA+  Renal usg c/w MRD  Increase amlodipine 10 mg daily  No indication for RRT  D/w patient              Subjective:   HPI: ANDRIY cr 8.0  Good UOP        Past Medical Hx:   Past Medical History:   Diagnosis Date    Arthritis     Chronic kidney disease     DVT (deep venous thrombosis) (HCC)     Left leg---date is approximate    Hyperlipidemia     Hypertension         Past Surgical Hx:     Past Surgical History:   Procedure Laterality Date    BICEPS TENDON REPAIR Right     COLONOSCOPY N/A 2025    COLONOSCOPY WITH REMOVAL POLYP SNARE/BIOPSY FORCEPS performed by West Krueger MD at [facility] ENDOSCOPY       Medications:  Prior to Admission medications    Medication Sig Start Date End Date Taking? Authorizing Provider   amLODIPine (NORVASC) 5 MG tablet Take 1 tablet by mouth daily 24  Yes Jeimy Collins MD   cloNIDine (CATAPRES) 0.1 MG tablet Take 1 tablet by mouth 2 times daily  Patient not taking: Reported on 3/19/2025    Provider, MD Jose Carlos   tamsulosin (FLOMAX) 0.4 MG capsule Take 1 capsule by mouth daily  Patient taking differently: Take 1 capsule by mouth daily Taking as needed per patient 24   Jeimy Collins MD   acetaminophen (TYLENOL) 500 MG tablet Take 2 tablets by mouth 3 times daily as needed for Pain  Patient not taking: Reported on 3/19/2025 4/25/24   Jeimy Collins MD       No Known    Component Value Date    WBC 7.1 04/08/2025    HGB 13.4 04/08/2025    HCT 39.2 04/08/2025    MCV 81.3 04/08/2025     04/08/2025    LYMPHOPCT 22.2 04/08/2025    RBC 4.82 04/08/2025    MCH 27.8 04/08/2025    MCHC 34.2 04/08/2025    RDW 13.9 04/08/2025                   Anne Khalil MD  4/9/2025    Thank you for consulting [facility] in the care of your patient.    [facility]:  [email or web address]  [email or web address]    [place] Office:  [address] [place] [address]  Phone: [phone]  Fax :     [phone]    [place] office:  [address] [place] [address]  Phone - [phone]  Fax - [phone]

## 2025-04-09 NOTE — PROGRESS NOTES
[facility]                                                                                          Hospitalist Progress Note  West Mccullough MD  Office Phone: [phone]        Date of Service:  2025  NAME:  Anthony Santos Jr.  :  1965  MRN:  [id number]       Admission Summary:     Anthony Santos is a 60 y.o.  male with PMHx CKD 3, HTN, arthritis, substance use disorder, hx left leg DVT  no longer on apixaban presents with b/l flank pain x 3 months, abdominal pain with n/v x 2 days, vomiting 3x/day, decreased appetite.  Dizzy and light headed with standing and feeling like he is going to pass out x 1 month.  No chest pain, SOB, fever, chills, nightsweats.  No leg edema.  +decreased urination.     He has been drinking 1 pint gin daily, stopped last Thursday.  Also sniffed heroin last Thursday.  Denies nsaid use.  Denies bleeding.     In ER, labs notable for Cr 12.7, previously 1.6  and lipase 571.  CT abdomen without contrast with suggestion of pancreatic inflammation.     Available records were reviewed at the time of H&P.       Interval history / Subjective:     Patient examined at bedside.  Left ankle wrapped up in bandage.  Beltran catheter taken out     Assessment & Plan:         ANDRIY on CKD  Anion gap metabolic acidosis resolved  Hyponatremia resolved  -Serum creatinine 8.  Ultrasound intraperitoneal 2025 medical renal disease without hydronephrosis.  ROSALIA positive RF negative.  Complement levels reviewed.  Hepatitis panel negative.  Assessed by nephrology appreciate help    - Continue IVF  -Trend creatinine    Alcohol induced hepatic pancreatitis improved  - Advance diet as tolerated    History of substance use  - UDS positive for cocaine   - Patient was counseled extensively on the need to abstain from drugs its addictive tendencies, its deleterious effects on the brain, cardiovascular system, lungs as well as its financial & social  HYDROmorphone (DILAUDID) injection 0.25 mg  0.25 mg IntraVENous Q4H PRN    tamsulosin (FLOMAX) capsule 0.4 mg  0.4 mg Oral Daily    pantoprazole (PROTONIX) 40 mg in sodium chloride (PF) 0.9 % 10 mL injection  40 mg IntraVENous Daily    sodium chloride flush 0.9 % injection 5-40 mL  5-40 mL IntraVENous 2 times per day    sodium chloride flush 0.9 % injection 5-40 mL  5-40 mL IntraVENous PRN    0.9 % sodium chloride infusion   IntraVENous PRN    heparin (porcine) injection 5,000 Units  5,000 Units SubCUTAneous 3 times per day    ondansetron (ZOFRAN-ODT) disintegrating tablet 4 mg  4 mg Oral Q8H PRN    Or    ondansetron (ZOFRAN) injection 4 mg  4 mg IntraVENous Q6H PRN    polyethylene glycol (GLYCOLAX) packet 17 g  17 g Oral Daily PRN    acetaminophen (TYLENOL) tablet 650 mg  650 mg Oral Q6H PRN    Or    acetaminophen (TYLENOL) suppository 650 mg  650 mg Rectal Q6H PRN     ______________________________________________________________________  EXPECTED LENGTH OF STAY: 4  ACTUAL LENGTH OF STAY:          3                 West Mccullough MD

## 2025-04-10 VITALS
SYSTOLIC BLOOD PRESSURE: 138 MMHG | RESPIRATION RATE: 16 BRPM | OXYGEN SATURATION: 99 % | BODY MASS INDEX: 29.26 KG/M2 | TEMPERATURE: 98.2 F | DIASTOLIC BLOOD PRESSURE: 99 MMHG | HEIGHT: 71 IN | WEIGHT: 209 LBS | HEART RATE: 86 BPM

## 2025-04-10 LAB
ALBUMIN SERPL-MCNC: 3 G/DL (ref 3.5–5)
ALBUMIN/GLOB SERPL: 0.7 (ref 1.1–2.2)
ALP SERPL-CCNC: 76 U/L (ref 45–117)
ALT SERPL-CCNC: 54 U/L (ref 12–78)
ANION GAP SERPL CALC-SCNC: 5 MMOL/L (ref 2–12)
AST SERPL-CCNC: 20 U/L (ref 15–37)
BASOPHILS # BLD: 0.03 K/UL (ref 0–0.1)
BASOPHILS NFR BLD: 0.5 % (ref 0–1)
BILIRUB SERPL-MCNC: 0.5 MG/DL (ref 0.2–1)
BUN SERPL-MCNC: 79 MG/DL (ref 6–20)
BUN/CREAT SERPL: 13 (ref 12–20)
C-ANCA TITR SER IF: NORMAL TITER
CALCIUM SERPL-MCNC: 9.3 MG/DL (ref 8.5–10.1)
CHLORIDE SERPL-SCNC: 106 MMOL/L (ref 97–108)
CO2 SERPL-SCNC: 26 MMOL/L (ref 21–32)
CREAT SERPL-MCNC: 5.92 MG/DL (ref 0.7–1.3)
DIFFERENTIAL METHOD BLD: ABNORMAL
EOSINOPHIL # BLD: 0.07 K/UL (ref 0–0.4)
EOSINOPHIL NFR BLD: 1.1 % (ref 0–7)
ERYTHROCYTE [DISTWIDTH] IN BLOOD BY AUTOMATED COUNT: 14.6 % (ref 11.5–14.5)
GBM IGG SER-ACNC: <0.2 UNITS (ref 0–0.9)
GLOBULIN SER CALC-MCNC: 4.2 G/DL (ref 2–4)
GLUCOSE BLD STRIP.AUTO-MCNC: 98 MG/DL (ref 65–117)
GLUCOSE SERPL-MCNC: 106 MG/DL (ref 65–100)
HCT VFR BLD AUTO: 37.8 % (ref 36.6–50.3)
HGB BLD-MCNC: 12.4 G/DL (ref 12.1–17)
IMM GRANULOCYTES # BLD AUTO: 0.05 K/UL (ref 0–0.04)
IMM GRANULOCYTES NFR BLD AUTO: 0.8 % (ref 0–0.5)
LYMPHOCYTES # BLD: 1.43 K/UL (ref 0.8–3.5)
LYMPHOCYTES NFR BLD: 23.4 % (ref 12–49)
MCH RBC QN AUTO: 27.7 PG (ref 26–34)
MCHC RBC AUTO-ENTMCNC: 32.8 G/DL (ref 30–36.5)
MCV RBC AUTO: 84.6 FL (ref 80–99)
MONOCYTES # BLD: 0.99 K/UL (ref 0–1)
MONOCYTES NFR BLD: 16.2 % (ref 5–13)
MYELOPEROXIDASE AB SER IA-ACNC: <0.2 UNITS (ref 0–0.9)
NEUTS SEG # BLD: 3.53 K/UL (ref 1.8–8)
NEUTS SEG NFR BLD: 58 % (ref 32–75)
NRBC # BLD: 0 K/UL (ref 0–0.01)
NRBC BLD-RTO: 0 PER 100 WBC
P-ANCA ATYPICAL TITR SER IF: NORMAL TITER
P-ANCA TITR SER IF: NORMAL TITER
PLATELET # BLD AUTO: 219 K/UL (ref 150–400)
PMV BLD AUTO: 9.5 FL (ref 8.9–12.9)
POTASSIUM SERPL-SCNC: 4.4 MMOL/L (ref 3.5–5.1)
PROT SERPL-MCNC: 7.2 G/DL (ref 6.4–8.2)
PROTEINASE3 AB SER IA-ACNC: <0.2 UNITS (ref 0–0.9)
RBC # BLD AUTO: 4.47 M/UL (ref 4.1–5.7)
SERVICE CMNT-IMP: NORMAL
SODIUM SERPL-SCNC: 137 MMOL/L (ref 136–145)
WBC # BLD AUTO: 6.1 K/UL (ref 4.1–11.1)

## 2025-04-10 PROCEDURE — 6370000000 HC RX 637 (ALT 250 FOR IP): Performed by: HOSPITALIST

## 2025-04-10 PROCEDURE — 80053 COMPREHEN METABOLIC PANEL: CPT

## 2025-04-10 PROCEDURE — 94761 N-INVAS EAR/PLS OXIMETRY MLT: CPT

## 2025-04-10 PROCEDURE — 36415 COLL VENOUS BLD VENIPUNCTURE: CPT

## 2025-04-10 PROCEDURE — 2580000003 HC RX 258: Performed by: INTERNAL MEDICINE

## 2025-04-10 PROCEDURE — 82962 GLUCOSE BLOOD TEST: CPT

## 2025-04-10 PROCEDURE — 85025 COMPLETE CBC W/AUTO DIFF WBC: CPT

## 2025-04-10 PROCEDURE — 6360000002 HC RX W HCPCS: Performed by: HOSPITALIST

## 2025-04-10 PROCEDURE — 6370000000 HC RX 637 (ALT 250 FOR IP): Performed by: INTERNAL MEDICINE

## 2025-04-10 RX ORDER — AMLODIPINE BESYLATE 10 MG/1
10 TABLET ORAL DAILY
Qty: 30 TABLET | Refills: 3 | Status: SHIPPED | OUTPATIENT
Start: 2025-04-10

## 2025-04-10 RX ORDER — PANTOPRAZOLE SODIUM 40 MG/1
40 TABLET, DELAYED RELEASE ORAL
Qty: 90 TABLET | Refills: 1 | Status: SHIPPED | OUTPATIENT
Start: 2025-04-10

## 2025-04-10 RX ORDER — TAMSULOSIN HYDROCHLORIDE 0.4 MG/1
0.4 CAPSULE ORAL DAILY
Qty: 30 CAPSULE | Refills: 3 | Status: SHIPPED | OUTPATIENT
Start: 2025-04-10

## 2025-04-10 RX ADMIN — AMLODIPINE BESYLATE 10 MG: 5 TABLET ORAL at 09:30

## 2025-04-10 RX ADMIN — HEPARIN SODIUM 5000 UNITS: 5000 INJECTION INTRAVENOUS; SUBCUTANEOUS at 05:32

## 2025-04-10 RX ADMIN — ACETAMINOPHEN 650 MG: 325 TABLET ORAL at 05:31

## 2025-04-10 RX ADMIN — SODIUM CHLORIDE, SODIUM LACTATE, POTASSIUM CHLORIDE, AND CALCIUM CHLORIDE: .6; .31; .03; .02 INJECTION, SOLUTION INTRAVENOUS at 03:32

## 2025-04-10 RX ADMIN — TAMSULOSIN HYDROCHLORIDE 0.4 MG: 0.4 CAPSULE ORAL at 09:30

## 2025-04-10 ASSESSMENT — PAIN DESCRIPTION - LOCATION: LOCATION: ANKLE

## 2025-04-10 ASSESSMENT — PAIN DESCRIPTION - ORIENTATION: ORIENTATION: LEFT

## 2025-04-10 ASSESSMENT — PAIN SCALES - GENERAL
PAINLEVEL_OUTOF10: 2
PAINLEVEL_OUTOF10: 7

## 2025-04-10 NOTE — PLAN OF CARE
Problem: Discharge Planning  Goal: Discharge to home or other facility with appropriate resources  Outcome: Progressing     Problem: Pain  Goal: Verbalizes/displays adequate comfort level or baseline comfort level  Outcome: Progressing     Problem: Skin/Tissue Integrity  Goal: Skin integrity remains intact  Description: 1.  Monitor for areas of redness and/or skin breakdown  2.  Assess vascular access sites hourly  3.  Every 4-6 hours minimum:  Change oxygen saturation probe site  4.  Every 4-6 hours:  If on nasal continuous positive airway pressure, respiratory therapy assess nares and determine need for appliance change or resting period  Outcome: Progressing     Problem: ABCDS Injury Assessment  Goal: Absence of physical injury  Outcome: Progressing     Problem: Safety - Adult  Goal: Free from fall injury  Outcome: Progressing     Problem: Physical Therapy - Adult  Goal: By Discharge: Performs mobility at highest level of function for planned discharge setting.  See evaluation for individualized goals.  Description: FUNCTIONAL STATUS PRIOR TO ADMISSION: Patient was independent and active without use of DME.    HOME SUPPORT PRIOR TO ADMISSION: The patient lived alone with no local support.    Physical Therapy Goals  Initiated 4/9/2025  1.  Patient will move from supine to sit and sit to supine, scoot up and down, and roll side to side in bed with independence within 7 day(s).    2.  Patient will perform sit to stand with independence within 7 day(s).  3.  Patient will transfer from bed to chair and chair to bed with independence using the least restrictive device within 7 day(s).  4.  Patient will ambulate with independence for 200 feet with the least restrictive device within 7 day(s).     4/9/2025 1556 by Hardeep Sanders, PT  Outcome: Progressing

## 2025-04-10 NOTE — PROGRESS NOTES
NEPHROLOGY CONSULT NOTE     Patient: Anthony Santos Jr. MRN: [id number]  PCP: Jeimy Collins MD   :     1965  Age:   60 y.o.  Sex:  male      Referring physician: West Mccullough MD  Reason for consultation:   Admission Date: 2025  6:44 PM  LOS: 4 days        ASSESSMENT and PLAN :   ANDRIY on CKD stage G3: Multifactorial: prerenal from pancreatitis vs LUNDBERG vs rule in GN less likely   -- improving nicely  -Cr 8.0  AGMA  -better   HTN  Hyponatremia   Polysubstance abuse  -UDS+ cocaine and opiates     Plan:  Overall cr better   SPEP + polyclonal increase detected one or more immunoglongulins, elevated C4, ROSALIA+, Anti Ds DNA negative, ANCA and PLA2 R ab negative  Renal usg c/w MRD  Continue Amlodipine  No indication for RRT  Okay to DC from renal stand point  BMP in 1 week             Subjective:   HPI: ANDRIY cr 5.9  Good UOP        Past Medical Hx:   Past Medical History:   Diagnosis Date    Arthritis     Chronic kidney disease     DVT (deep venous thrombosis) (HCC)     Left leg---date is approximate    Hyperlipidemia     Hypertension         Past Surgical Hx:     Past Surgical History:   Procedure Laterality Date    BICEPS TENDON REPAIR Right     COLONOSCOPY N/A 2025    COLONOSCOPY WITH REMOVAL POLYP SNARE/BIOPSY FORCEPS performed by West Krueger MD at [facility] ENDOSCOPY       Medications:  Prior to Admission medications    Medication Sig Start Date End Date Taking? Authorizing Provider   amLODIPine (NORVASC) 10 MG tablet Take 1 tablet by mouth daily 4/10/25  Yes West Mccullough MD   tamsulosin (FLOMAX) 0.4 MG capsule Take 1 capsule by mouth daily 4/10/25  Yes West Mccullough MD   pantoprazole (PROTONIX) 40 MG tablet Take 1 tablet by mouth every morning (before breakfast) 4/10/25  Yes West Mccullough MD   acetaminophen (TYLENOL) 500 MG tablet Take 2 tablets by mouth 3 times daily as needed for Pain 24  Yes Jeimy Collins MD       No Known Allergies    Social Hx:  reports that he has never    Component Value Date    WBC 6.1 04/10/2025    HGB 12.4 04/10/2025    HCT 37.8 04/10/2025    MCV 84.6 04/10/2025     04/10/2025    LYMPHOPCT 23.4 04/10/2025    RBC 4.47 04/10/2025    MCH 27.7 04/10/2025    MCHC 32.8 04/10/2025    RDW 14.6 (H) 04/10/2025                   Anne Khalil MD  4/10/2025    Thank you for consulting [facility] in the care of your patient.    [facility]:  [email or web address]  [email or web address]    [place] Office:  [address] [place] [address]  Phone: [phone]  Fax :     [phone]    [place] office:  [address] [place] [address]  Phone - [phone]  Fax - [phone]

## 2025-04-10 NOTE — PLAN OF CARE
Problem: Discharge Planning  Goal: Discharge to home or other facility with appropriate resources  4/10/2025 1300 by Manuel Barr RN  Outcome: Progressing  4/10/2025 1223 by Manuel Barr RN  Outcome: Progressing  4/10/2025 0050 by Peter Amaya RN  Outcome: Progressing     Problem: Pain  Goal: Verbalizes/displays adequate comfort level or baseline comfort level  4/10/2025 1300 by Manuel Barr RN  Outcome: Progressing  4/10/2025 1223 by Manuel Barr RN  Outcome: Progressing  4/10/2025 0050 by Peter Amaya RN  Outcome: Progressing     Problem: Skin/Tissue Integrity  Goal: Skin integrity remains intact  Description: 1.  Monitor for areas of redness and/or skin breakdown  2.  Assess vascular access sites hourly  3.  Every 4-6 hours minimum:  Change oxygen saturation probe site  4.  Every 4-6 hours:  If on nasal continuous positive airway pressure, respiratory therapy assess nares and determine need for appliance change or resting period  4/10/2025 1300 by Manuel Barr RN  Outcome: Progressing  4/10/2025 1223 by Manuel Barr RN  Outcome: Progressing  4/10/2025 0050 by Peter Amaya RN  Outcome: Progressing     Problem: ABCDS Injury Assessment  Goal: Absence of physical injury  4/10/2025 1300 by Manuel Barr RN  Outcome: Progressing  4/10/2025 1223 by Manuel Barr RN  Outcome: Progressing  4/10/2025 0050 by Peter Amaya RN  Outcome: Progressing     Problem: Safety - Adult  Goal: Free from fall injury  4/10/2025 1300 by Manuel Barr RN  Outcome: Progressing  4/10/2025 1223 by Manuel Barr RN  Outcome: Progressing  4/10/2025 0050 by Peter Amaya RN  Outcome: Progressing

## 2025-04-10 NOTE — CARE COORDINATION
Care Management Progress Note      Reason for Admission:   ANDRIY (acute kidney injury) [N17.9]         Patient Admission Status: Inpatient  RUR: 12%  Hospitalization in the last 30 days (Readmission):  No        Transition of care plan:  Patient was discussed in IDR and is medically ready for discharge.     Dispo: Return home with his mother.     DME: RW has been delivered to pt's bedside for home.     Discharge plan communicated with patient and/or discharge caregiver: Yes      Date 1st IMM letter given: N/a. Patient has a managed medicaid plan.     Outpatient follow-up.    Transport at discharge: Round trip ambulatory transport has been arranged for 12:30pm . Nursing notified.         ___________________________________________   Nancy Juarez RN Case Manager  4/10/2025   11:55 AM

## 2025-04-10 NOTE — DISCHARGE INSTRUCTIONS
- Please have repeat kidney function in 1 week  - Follow up with Kidney doctor( nephrologist)   - Please avoid NSAIDs

## 2025-04-10 NOTE — DISCHARGE SUMMARY
Discharge Summary   Please note that this dictation was completed with [employer], the computer voice recognition software.  Quite often unanticipated grammatical, syntax, homophones, and other interpretive errors are inadvertently transcribed by the computer software.  Please disregard these errors.  Please excuse any errors that have escaped final proofreading.    PATIENT ID: Anthony Santos Jr.  MRN: [id number]   YOB: 1965    DATE OF ADMISSION: 4/6/2025  6:44 PM    DATE OF DISCHARGE: 4/10/2025  PRIMARY CARE PROVIDER: Jeimy Collins MD         ATTENDING PHYSICIAN: West Mccullough MD  DISCHARGING PROVIDER: West Mccullough MD       CONSULTATIONS: IP CONSULT TO NEPHROLOGY  IP CONSULT TO CASE MANAGEMENT    PROCEDURES/SURGERIES: * No surgery found *    ADMITTING HPI from excerpted H&P     Anthony Santos is a 60 y.o.  male with PMHx CKD 3, HTN, arthritis, substance use disorder, hx left leg DVT 2023 no longer on apixaban presents with b/l flank pain x 3 months, abdominal pain with n/v x 2 days, vomiting 3x/day, decreased appetite.  Dizzy and light headed with standing and feeling like he is going to pass out x 1 month.  No chest pain, SOB, fever, chills, nightsweats.  No leg edema.  +decreased urination.     He has been drinking 1 pint gin daily, stopped last Thursday.  Also sniffed heroin last Thursday.  Denies nsaid use.  Denies bleeding.     In ER, labs notable for Cr 12.7, previously 1.6 12/24 and lipase 571.  CT abdomen without contrast with suggestion of pancreatic inflammation.     Available records were reviewed at the time of H&P.        HOSPITAL COURSE & DISCHARGE DIAGNOSIS/ PLAN:     ANDRIY on CKD improved  Anion gap metabolic acidosis resolved  Hyponatremia resolved  -Serum creatinine 8.  Ultrasound intraperitoneal 4/7/2025 medical renal disease without hydronephrosis.  ROSALIA positive RF negative.  Complement levels reviewed.SPEP + polyclonal increase detected one or more immunoglongulins,

## 2025-04-10 NOTE — PLAN OF CARE
Problem: Discharge Planning  Goal: Discharge to home or other facility with appropriate resources  4/10/2025 1223 by Manuel Barr RN  Outcome: Progressing  4/10/2025 0050 by Peter Amaya RN  Outcome: Progressing     Problem: Pain  Goal: Verbalizes/displays adequate comfort level or baseline comfort level  4/10/2025 1223 by Manuel Barr RN  Outcome: Progressing  4/10/2025 0050 by Peter Amaya RN  Outcome: Progressing     Problem: Skin/Tissue Integrity  Goal: Skin integrity remains intact  Description: 1.  Monitor for areas of redness and/or skin breakdown  2.  Assess vascular access sites hourly  3.  Every 4-6 hours minimum:  Change oxygen saturation probe site  4.  Every 4-6 hours:  If on nasal continuous positive airway pressure, respiratory therapy assess nares and determine need for appliance change or resting period  4/10/2025 1223 by Manuel Barr RN  Outcome: Progressing  4/10/2025 0050 by Peter Amaya RN  Outcome: Progressing     Problem: ABCDS Injury Assessment  Goal: Absence of physical injury  4/10/2025 1223 by Manuel Barr RN  Outcome: Progressing  4/10/2025 0050 by Peter Amaya RN  Outcome: Progressing     Problem: Safety - Adult  Goal: Free from fall injury  4/10/2025 1223 by Manuel Barr RN  Outcome: Progressing  4/10/2025 0050 by Peter Amaya RN  Outcome: Progressing

## 2025-04-11 ENCOUNTER — TELEPHONE (OUTPATIENT)
Age: 60
End: 2025-04-11

## 2025-04-11 NOTE — TELEPHONE ENCOUNTER
Care Transitions Initial Follow Up Call    Outreach made within 2 business days of discharge: Yes    Patient: Anthony Santos Jr. Patient : 1965   MRN: [id number]  Reason for Admission: Acute Kidney injury  Discharge Date: 4/10/25       Spoke with: Patient     Discharge department/facility: [facility]     TCM Interactive Patient Contact:  Was patient able to fill all prescriptions: Yes  Was patient instructed to bring all medications to the follow-up visit: Yes  Is patient taking all medications as directed in the discharge summary? Yes  Does patient understand their discharge instructions: Yes  Does patient have questions or concerns that need addressed prior to 7-14 day follow up office visit: no    Additional needs identified to be addressed with provider  No needs identified             Scheduled appointment with PCP within 7-14 days    Follow Up  Future Appointments   Date Time Provider Department Center   2025  2:15 PM Jeimy Collins MD [facility] DEP       Suzanne Castro LPN

## 2025-04-13 NOTE — PROGRESS NOTES
Physician Progress Note      PATIENT:               JENNIFER JARRELL  [facility] #:                  [id number]  :                       1965  ADMIT DATE:       2025 6:44 PM  DISCH DATE:        4/10/2025 1:36 PM  RESPONDING  PROVIDER #:        Anne Khalil MD          QUERY TEXT:    Based on your medical judgment, please clarify these findings and document if   any of the following are being evaluated and/or treated:    The clinical indicators include:  Nephrology consult on  ANDRIY stage III -prerenal azotemia versus ATN from   decreased perfusion secondary to volume depletion in the setting of   pancreatitis + recent cocaine and heroine use,  CKD stage IIIa Historic EGFR of 47 - Likely from hypertension  Nephrology consult on  ANDRIY on CKD stage G3: Multifactorial: prerenal from   pancreatitis vs LUNDBERG vs rule in GN less likely  discharge Summary PN on 04/10 ANDRIY on CKD improved   Cr 12.70, , GFR 4,   Cr 12.60, , GFR 4,   Cr 10, , GFR 6,   Cr 8.10, , GFR 7,  04/10 Cr 5.92, BUN 79, GFR 10,    History of hypertension    IVF, Nephrology consult, monitoring lab,      Thank you,  Kenny Radford [facility] CDS  Options provided:  -- Acute kidney failure with acute tubular necrosis  -- Acute kidney injury  -- Other - I will add my own diagnosis  -- Disagree - Not applicable / Not valid  -- Disagree - Clinically unable to determine / Unknown  -- Refer to Clinical Documentation Reviewer    PROVIDER RESPONSE TEXT:    This patient is in Acute kidney failure with acute tubular necrosis.    Query created by: Kenny Radford on 2025 5:21 AM      Electronically signed by:  Anne Khalil MD 2025 3:27 PM

## 2025-04-14 ENCOUNTER — TELEMEDICINE (OUTPATIENT)
Age: 60
End: 2025-04-14
Payer: MEDICAID

## 2025-04-14 DIAGNOSIS — N17.9 AKI (ACUTE KIDNEY INJURY): Primary | ICD-10-CM

## 2025-04-14 DIAGNOSIS — N40.1 BENIGN PROSTATIC HYPERPLASIA WITH LOWER URINARY TRACT SYMPTOMS, SYMPTOM DETAILS UNSPECIFIED: ICD-10-CM

## 2025-04-14 DIAGNOSIS — I82.402 RECURRENT ACUTE DEEP VEIN THROMBOSIS (DVT) OF LEFT LOWER EXTREMITY: ICD-10-CM

## 2025-04-14 DIAGNOSIS — M25.572 LEFT ANKLE PAIN, UNSPECIFIED CHRONICITY: ICD-10-CM

## 2025-04-14 DIAGNOSIS — I82.409 RECURRENT ACUTE DEEP VEIN THROMBOSIS (DVT) OF LOWER EXTREMITY, UNSPECIFIED LATERALITY: ICD-10-CM

## 2025-04-14 DIAGNOSIS — F11.21 HISTORY OF NARCOTIC ADDICTION (HCC): ICD-10-CM

## 2025-04-14 DIAGNOSIS — I10 PRIMARY HYPERTENSION: ICD-10-CM

## 2025-04-14 PROCEDURE — 99214 OFFICE O/P EST MOD 30 MIN: CPT | Performed by: INTERNAL MEDICINE

## 2025-04-14 ASSESSMENT — ENCOUNTER SYMPTOMS
GASTROINTESTINAL NEGATIVE: 1
RESPIRATORY NEGATIVE: 1
EYES NEGATIVE: 1

## 2025-04-14 NOTE — PROGRESS NOTES
Anthony Santos Jr., was evaluated through a synchronous (real-time) audio-video encounter. The patient (or guardian if applicable) is aware that this is a billable service, which includes applicable co-pays. This Virtual Visit was conducted with patient's (and/or legal guardian's) consent. Patient identification was verified, and a caregiver was present when appropriate.   The patient was located at Home: [address]   [address]  [place] [address]  Provider was located at Facility (Appt Dept): [address]  [place] [address]  Confirm you are appropriately licensed, registered, or certified to deliver care in the state where the patient is located as indicated above. If you are not or unsure, please re-schedule the visit: Yes, I confirm.     Anthony Santos Jr. (:  1965) is a Established patient, presenting virtually for evaluation of the following:      Below is the assessment and plan developed based on review of pertinent history, physical exam, labs, studies, and medications.     Assessment & Plan  ANDRIY (acute kidney injury)       Orders:    Comprehensive Metabolic Panel    CK    Primary hypertension       Orders:    Comprehensive Metabolic Panel    Recurrent acute deep vein thrombosis (DVT) of left lower extremity            Benign prostatic hyperplasia with lower urinary tract symptoms, symptom details unspecified            Left ankle pain, unspecified chronicity            History of narcotic addiction (HCC)            Recurrent acute deep vein thrombosis (DVT) of lower extremity, unspecified laterality               Assessment & Plan  1. Chronic Kidney Disease: Chronic.  Predialysis stage.  Creatinine has improved.  BUN levels are significantly elevated, indicating inadequate hydration.  - Current medications, including pantoprazole, amlodipine, and Flomax, are not nephrotoxic.  - Advised to consume at least 4 to 6 8 oz  bottles of water daily and to

## 2025-04-14 NOTE — PROGRESS NOTES
Chief Complaint   Patient presents with    Follow-Up from Hospital     \"Have you been to the ER, urgent care clinic since your last visit?  Hospitalized since your last visit?\"    Yes [facility]   “Have you seen or consulted any other health care providers outside our system since your last visit?”    NO

## (undated) DEVICE — SINGLE USE AIR/WATER, SUCTION AND BIOPSY VALVES SET: Brand: ORCAPOD™